# Patient Record
Sex: MALE | Race: OTHER | HISPANIC OR LATINO
[De-identification: names, ages, dates, MRNs, and addresses within clinical notes are randomized per-mention and may not be internally consistent; named-entity substitution may affect disease eponyms.]

---

## 2017-01-04 ENCOUNTER — APPOINTMENT (OUTPATIENT)
Dept: ORTHOPEDIC SURGERY | Facility: CLINIC | Age: 52
End: 2017-01-04

## 2017-01-04 VITALS — BODY MASS INDEX: 27.28 KG/M2 | WEIGHT: 180 LBS | HEIGHT: 68 IN

## 2017-01-04 VITALS — SYSTOLIC BLOOD PRESSURE: 115 MMHG | HEART RATE: 73 BPM | DIASTOLIC BLOOD PRESSURE: 75 MMHG

## 2017-01-04 DIAGNOSIS — Z78.9 OTHER SPECIFIED HEALTH STATUS: ICD-10-CM

## 2017-01-11 ENCOUNTER — APPOINTMENT (OUTPATIENT)
Dept: INTERNAL MEDICINE | Facility: CLINIC | Age: 52
End: 2017-01-11

## 2017-06-28 ENCOUNTER — APPOINTMENT (OUTPATIENT)
Dept: INTERNAL MEDICINE | Facility: CLINIC | Age: 52
End: 2017-06-28

## 2017-06-28 VITALS
OXYGEN SATURATION: 97 % | HEIGHT: 68 IN | WEIGHT: 181 LBS | SYSTOLIC BLOOD PRESSURE: 120 MMHG | TEMPERATURE: 97.8 F | HEART RATE: 66 BPM | RESPIRATION RATE: 14 BRPM | DIASTOLIC BLOOD PRESSURE: 70 MMHG | BODY MASS INDEX: 27.43 KG/M2

## 2017-06-28 DIAGNOSIS — D69.6 THROMBOCYTOPENIA, UNSPECIFIED: ICD-10-CM

## 2017-06-28 DIAGNOSIS — F51.02 ADJUSTMENT INSOMNIA: ICD-10-CM

## 2017-06-28 DIAGNOSIS — E73.9 LACTOSE INTOLERANCE, UNSPECIFIED: ICD-10-CM

## 2017-06-28 DIAGNOSIS — R19.7 DIARRHEA, UNSPECIFIED: ICD-10-CM

## 2017-06-28 DIAGNOSIS — R76.8 OTHER SPECIFIED ABNORMAL IMMUNOLOGICAL FINDINGS IN SERUM: ICD-10-CM

## 2017-06-28 DIAGNOSIS — M54.5 LOW BACK PAIN: ICD-10-CM

## 2017-06-28 DIAGNOSIS — M47.817 SPONDYLOSIS W/OUT MYELOPATHY OR RADICULOPATHY, LUMBOSACRAL REGION: ICD-10-CM

## 2017-06-28 DIAGNOSIS — R06.83 SNORING: ICD-10-CM

## 2017-07-01 ENCOUNTER — LABORATORY RESULT (OUTPATIENT)
Age: 52
End: 2017-07-01

## 2017-07-09 LAB
25(OH)D3 SERPL-MCNC: 28 NG/ML
ALBUMIN SERPL ELPH-MCNC: 4.3 G/DL
ALP BLD-CCNC: 71 U/L
ALT SERPL-CCNC: 56 U/L
ANA PAT FLD IF-IMP: ABNORMAL
ANA SER IF-ACNC: ABNORMAL
ANION GAP SERPL CALC-SCNC: 15 MMOL/L
APPEARANCE: CLEAR
AST SERPL-CCNC: 30 U/L
BACTERIA STL CULT: NORMAL
BASOPHILS # BLD AUTO: 0.01 K/UL
BASOPHILS NFR BLD AUTO: 0.2 %
BILIRUB SERPL-MCNC: 0.9 MG/DL
BILIRUBIN URINE: NEGATIVE
BLOOD URINE: NEGATIVE
BUN SERPL-MCNC: 15 MG/DL
CALCIUM SERPL-MCNC: 9.7 MG/DL
CHLORIDE SERPL-SCNC: 103 MMOL/L
CHOLEST SERPL-MCNC: 197 MG/DL
CHOLEST/HDLC SERPL: 3.5 RATIO
CO2 SERPL-SCNC: 23 MMOL/L
COLOR: YELLOW
CREAT SERPL-MCNC: 1.01 MG/DL
DEPRECATED O AND P PREP STL: NORMAL
EOSINOPHIL # BLD AUTO: 0.05 K/UL
EOSINOPHIL NFR BLD AUTO: 0.9 %
GLUCOSE QUALITATIVE U: NORMAL MG/DL
GLUCOSE SERPL-MCNC: 102 MG/DL
HBA1C MFR BLD HPLC: 5.6 %
HCT VFR BLD CALC: 44.1 %
HDLC SERPL-MCNC: 57 MG/DL
HEMOCCULT STL QL IA: NEGATIVE
HGB BLD-MCNC: 14.8 G/DL
IMM GRANULOCYTES NFR BLD AUTO: 0.5 %
KETONES URINE: ABNORMAL
LDLC SERPL CALC-MCNC: 116 MG/DL
LEUKOCYTE ESTERASE URINE: ABNORMAL
LYMPHOCYTES # BLD AUTO: 1.46 K/UL
LYMPHOCYTES NFR BLD AUTO: 25 %
MAN DIFF?: NORMAL
MCHC RBC-ENTMCNC: 31.6 PG
MCHC RBC-ENTMCNC: 33.6 GM/DL
MCV RBC AUTO: 94 FL
MONOCYTES # BLD AUTO: 0.37 K/UL
MONOCYTES NFR BLD AUTO: 6.3 %
NEUTROPHILS # BLD AUTO: 3.91 K/UL
NEUTROPHILS NFR BLD AUTO: 67.1 %
NITRITE URINE: NEGATIVE
PH URINE: 5.5
PLATELET # BLD AUTO: 135 K/UL
POTASSIUM SERPL-SCNC: 4.4 MMOL/L
PROT SERPL-MCNC: 7.4 G/DL
PROTEIN URINE: NEGATIVE MG/DL
PSA SERPL-MCNC: 1.48 NG/ML
RBC # BLD: 4.69 M/UL
RBC # FLD: 13.5 %
SODIUM SERPL-SCNC: 141 MMOL/L
SPECIFIC GRAVITY URINE: 1.03
TRIGL SERPL-MCNC: 121 MG/DL
TSH SERPL-ACNC: 1.61 UIU/ML
UROBILINOGEN URINE: NORMAL MG/DL
WBC # FLD AUTO: 5.83 K/UL

## 2018-07-11 ENCOUNTER — APPOINTMENT (OUTPATIENT)
Dept: INTERNAL MEDICINE | Facility: CLINIC | Age: 53
End: 2018-07-11

## 2018-07-25 PROBLEM — M47.817 LUMBOSACRAL SPONDYLOSIS: Status: RESOLVED | Noted: 2017-01-04 | Resolved: 2018-07-25

## 2018-07-30 ENCOUNTER — TRANSCRIPTION ENCOUNTER (OUTPATIENT)
Age: 53
End: 2018-07-30

## 2018-08-07 ENCOUNTER — TRANSCRIPTION ENCOUNTER (OUTPATIENT)
Age: 53
End: 2018-08-07

## 2019-04-23 ENCOUNTER — APPOINTMENT (OUTPATIENT)
Dept: INTERNAL MEDICINE | Facility: CLINIC | Age: 54
End: 2019-04-23

## 2019-06-04 ENCOUNTER — APPOINTMENT (OUTPATIENT)
Dept: FAMILY MEDICINE | Facility: CLINIC | Age: 54
End: 2019-06-04
Payer: OTHER GOVERNMENT

## 2019-06-04 VITALS
SYSTOLIC BLOOD PRESSURE: 117 MMHG | HEIGHT: 68 IN | DIASTOLIC BLOOD PRESSURE: 79 MMHG | WEIGHT: 180 LBS | HEART RATE: 74 BPM | OXYGEN SATURATION: 98 % | BODY MASS INDEX: 27.28 KG/M2

## 2019-06-04 DIAGNOSIS — Z00.00 ENCOUNTER FOR GENERAL ADULT MEDICAL EXAMINATION W/OUT ABNORMAL FINDINGS: ICD-10-CM

## 2019-06-04 PROCEDURE — 36415 COLL VENOUS BLD VENIPUNCTURE: CPT

## 2019-06-04 PROCEDURE — 99396 PREV VISIT EST AGE 40-64: CPT | Mod: 25

## 2019-06-04 NOTE — HISTORY OF PRESENT ILLNESS
[FreeTextEntry1] : annual physical [de-identified] : 54 yo M with pre-diabetes, thrombocytopenia, presents for annual physical. He sees Dr. Landeros normally but is on leave at the moment. There was hx positive ALYSON but pt reports no symptoms. He feels well. Only complaint he had today was increased cerumen production since he went to urgent care and had his ears cleaned out. \par \par diet-- very healthy\par exercise-- daily, he is with [employer]

## 2019-06-04 NOTE — PHYSICAL EXAM
[No Acute Distress] : no acute distress [Well Nourished] : well nourished [Normal TMs] : both tympanic membranes were normal [Supple] : supple [Thyroid Normal, No Nodules] : the thyroid was normal and there were no nodules present [No Respiratory Distress] : no respiratory distress  [No Lymphadenopathy] : no lymphadenopathy [Clear to Auscultation] : lungs were clear to auscultation bilaterally [Normal Rate] : normal rate  [Regular Rhythm] : with a regular rhythm [Normal S1, S2] : normal S1 and S2 [Normal Posterior Cervical Nodes] : no posterior cervical lymphadenopathy [No Murmur] : no murmur heard [Normal Anterior Cervical Nodes] : no anterior cervical lymphadenopathy [Normal Gait] : normal gait [Normal Affect] : the affect was normal [Normal Insight/Judgement] : insight and judgment were intact [de-identified] : minor cerumen noted b/l

## 2019-06-04 NOTE — HEALTH RISK ASSESSMENT
[] : No [No falls in past year] : Patient reported no falls in the past year [0] : 2) Feeling down, depressed, or hopeless: Not at all (0) [de-identified] : daily exercise [de-identified] : healthy [[id number]] : 0 [HIV test declined] : HIV test declined [Hepatitis C test declined] : Hepatitis C test declined [] :  [Sexually Active] : sexually active [Employed] : employed [High Risk Behavior] : no high risk behavior [Fully functional (bathing, dressing, toileting, transferring, walking, feeding)] : Fully functional (bathing, dressing, toileting, transferring, walking, feeding) [Fully functional (using the telephone, shopping, preparing meals, housekeeping, doing laundry, using] : Fully functional and needs no help or supervision to perform IADLs (using the telephone, shopping, preparing meals, housekeeping, doing laundry, using transportation, managing medications and managing finances)

## 2019-06-05 LAB
25(OH)D3 SERPL-MCNC: 28.6 NG/ML
ALBUMIN SERPL ELPH-MCNC: 4.8 G/DL
ALP BLD-CCNC: 76 U/L
ALT SERPL-CCNC: 26 U/L
ANION GAP SERPL CALC-SCNC: 12 MMOL/L
AST SERPL-CCNC: 22 U/L
BASOPHILS # BLD AUTO: 0.02 K/UL
BASOPHILS NFR BLD AUTO: 0.4 %
BILIRUB SERPL-MCNC: 0.4 MG/DL
BUN SERPL-MCNC: 16 MG/DL
CALCIUM SERPL-MCNC: 9.6 MG/DL
CHLORIDE SERPL-SCNC: 103 MMOL/L
CHOLEST SERPL-MCNC: 204 MG/DL
CHOLEST/HDLC SERPL: 3.6 RATIO
CO2 SERPL-SCNC: 27 MMOL/L
CREAT SERPL-MCNC: 0.93 MG/DL
EOSINOPHIL # BLD AUTO: 0.11 K/UL
EOSINOPHIL NFR BLD AUTO: 1.9 %
ESTIMATED AVERAGE GLUCOSE: 114 MG/DL
GLUCOSE SERPL-MCNC: 97 MG/DL
HBA1C MFR BLD HPLC: 5.6 %
HCT VFR BLD CALC: 47.4 %
HDLC SERPL-MCNC: 56 MG/DL
HGB BLD-MCNC: 15.2 G/DL
IMM GRANULOCYTES NFR BLD AUTO: 0.5 %
LDLC SERPL CALC-MCNC: 109 MG/DL
LYMPHOCYTES # BLD AUTO: 1.69 K/UL
LYMPHOCYTES NFR BLD AUTO: 29.7 %
MAN DIFF?: NORMAL
MCHC RBC-ENTMCNC: 31 PG
MCHC RBC-ENTMCNC: 32.1 GM/DL
MCV RBC AUTO: 96.5 FL
MONOCYTES # BLD AUTO: 0.41 K/UL
MONOCYTES NFR BLD AUTO: 7.2 %
NEUTROPHILS # BLD AUTO: 3.43 K/UL
NEUTROPHILS NFR BLD AUTO: 60.3 %
PLATELET # BLD AUTO: 128 K/UL
POTASSIUM SERPL-SCNC: 4.1 MMOL/L
PROT SERPL-MCNC: 7.4 G/DL
PSA SERPL-MCNC: 1.63 NG/ML
RBC # BLD: 4.91 M/UL
RBC # FLD: 12.5 %
SODIUM SERPL-SCNC: 142 MMOL/L
TRIGL SERPL-MCNC: 193 MG/DL
TSH SERPL-ACNC: 1.99 UIU/ML
WBC # FLD AUTO: 5.69 K/UL

## 2020-06-22 ENCOUNTER — TRANSCRIPTION ENCOUNTER (OUTPATIENT)
Age: 55
End: 2020-06-22

## 2020-08-15 ENCOUNTER — TRANSCRIPTION ENCOUNTER (OUTPATIENT)
Age: 55
End: 2020-08-15

## 2020-08-20 ENCOUNTER — NON-APPOINTMENT (OUTPATIENT)
Age: 55
End: 2020-08-20

## 2020-08-20 ENCOUNTER — APPOINTMENT (OUTPATIENT)
Dept: INTERNAL MEDICINE | Facility: CLINIC | Age: 55
End: 2020-08-20
Payer: OTHER GOVERNMENT

## 2020-08-20 VITALS
OXYGEN SATURATION: 98 % | DIASTOLIC BLOOD PRESSURE: 82 MMHG | HEART RATE: 73 BPM | SYSTOLIC BLOOD PRESSURE: 116 MMHG | WEIGHT: 184.25 LBS | BODY MASS INDEX: 27.92 KG/M2 | HEIGHT: 68 IN | TEMPERATURE: 97.6 F

## 2020-08-20 DIAGNOSIS — M79.642 PAIN IN LEFT HAND: ICD-10-CM

## 2020-08-20 DIAGNOSIS — E03.9 HYPOTHYROIDISM, UNSPECIFIED: ICD-10-CM

## 2020-08-20 PROCEDURE — 99386 PREV VISIT NEW AGE 40-64: CPT | Mod: 25

## 2020-08-20 PROCEDURE — 93000 ELECTROCARDIOGRAM COMPLETE: CPT

## 2020-08-20 PROCEDURE — 36415 COLL VENOUS BLD VENIPUNCTURE: CPT

## 2020-08-20 NOTE — HEALTH RISK ASSESSMENT
[Patient declined mammogram] : Patient declined mammogram [Patient declined bone density test] : Patient declined bone density test [Patient declined PAP Smear] : Patient declined PAP Smear [Patient declined colonoscopy] : Patient declined colonoscopy [Good] : ~his/her~  mood as  good [Yes] : Yes [] : No [Monthly or less (1 pt)] : Monthly or less (1 point) [1 or 2 (0 pts)] : 1 or 2 (0 points) [Never (0 pts)] : Never (0 points) [No] : In the past 12 months have you used drugs other than those required for medical reasons? No [No falls in past year] : Patient reported no falls in the past year [[id number]] : 0 [0] : 2) Feeling down, depressed, or hopeless: Not at all (0) [Patient declined Low Dose CT Scan] : Patient declined Low Dose CT Scan [Patient declined Retinal Exam] : Patient declined Retinal Exam. [Hepatitis C test declined] : Hepatitis C test declined [HIV test declined] : HIV test declined

## 2020-08-20 NOTE — PHYSICAL EXAM
[Well Nourished] : well nourished [No Acute Distress] : no acute distress [Well-Appearing] : well-appearing [Well Developed] : well developed [PERRL] : pupils equal round and reactive to light [Normal Sclera/Conjunctiva] : normal sclera/conjunctiva [EOMI] : extraocular movements intact [Normal Oropharynx] : the oropharynx was normal [Normal Outer Ear/Nose] : the outer ears and nose were normal in appearance [No JVD] : no jugular venous distention [Supple] : supple [No Lymphadenopathy] : no lymphadenopathy [Thyroid Normal, No Nodules] : the thyroid was normal and there were no nodules present [No Respiratory Distress] : no respiratory distress  [Clear to Auscultation] : lungs were clear to auscultation bilaterally [Normal Rate] : normal rate  [No Accessory Muscle Use] : no accessory muscle use [Regular Rhythm] : with a regular rhythm [Normal S1, S2] : normal S1 and S2 [No Carotid Bruits] : no carotid bruits [No Murmur] : no murmur heard [No Varicosities] : no varicosities [No Abdominal Bruit] : a ~M bruit was not heard ~T in the abdomen [No Edema] : there was no peripheral edema [Pedal Pulses Present] : the pedal pulses are present [No Extremity Clubbing/Cyanosis] : no extremity clubbing/cyanosis [No Palpable Aorta] : no palpable aorta [Soft] : abdomen soft [Non Tender] : non-tender [Non-distended] : non-distended [No Masses] : no abdominal mass palpated [No HSM] : no HSM [Normal Posterior Cervical Nodes] : no posterior cervical lymphadenopathy [Normal Bowel Sounds] : normal bowel sounds [No CVA Tenderness] : no CVA  tenderness [Normal Anterior Cervical Nodes] : no anterior cervical lymphadenopathy [No Joint Swelling] : no joint swelling [No Spinal Tenderness] : no spinal tenderness [Grossly Normal Strength/Tone] : grossly normal strength/tone [Coordination Grossly Intact] : coordination grossly intact [No Rash] : no rash [Normal Gait] : normal gait [No Focal Deficits] : no focal deficits [Normal Affect] : the affect was normal [Normal Insight/Judgement] : insight and judgment were intact

## 2020-08-20 NOTE — HISTORY OF PRESENT ILLNESS
[FreeTextEntry1] : np est [de-identified] : Mr. SANJEEV ESPARZA is a 54 year male comes to the office for physical exam. Patient feels well and has no complaints at this time.

## 2020-08-21 LAB
25(OH)D3 SERPL-MCNC: 40.4 NG/ML
BASOPHILS # BLD AUTO: 0.03 K/UL
BASOPHILS NFR BLD AUTO: 0.5 %
CHOLEST SERPL-MCNC: 217 MG/DL
CHOLEST/HDLC SERPL: 4.1 RATIO
EOSINOPHIL # BLD AUTO: 0.17 K/UL
EOSINOPHIL NFR BLD AUTO: 3 %
ESTIMATED AVERAGE GLUCOSE: 114 MG/DL
HBA1C MFR BLD HPLC: 5.6 %
HCT VFR BLD CALC: 48.4 %
HDLC SERPL-MCNC: 53 MG/DL
HGB BLD-MCNC: 15.7 G/DL
IMM GRANULOCYTES NFR BLD AUTO: 0.5 %
LDLC SERPL CALC-MCNC: 103 MG/DL
LYMPHOCYTES # BLD AUTO: 1.62 K/UL
LYMPHOCYTES NFR BLD AUTO: 28.3 %
MAN DIFF?: NORMAL
MCHC RBC-ENTMCNC: 31.3 PG
MCHC RBC-ENTMCNC: 32.4 GM/DL
MCV RBC AUTO: 96.4 FL
MONOCYTES # BLD AUTO: 0.41 K/UL
MONOCYTES NFR BLD AUTO: 7.2 %
NEUTROPHILS # BLD AUTO: 3.46 K/UL
NEUTROPHILS NFR BLD AUTO: 60.5 %
PLATELET # BLD AUTO: 129 K/UL
PSA SERPL-MCNC: 1.42 NG/ML
RBC # BLD: 5.02 M/UL
RBC # FLD: 12.3 %
TRIGL SERPL-MCNC: 303 MG/DL
TSH SERPL-ACNC: 1.63 UIU/ML
WBC # FLD AUTO: 5.72 K/UL

## 2020-09-25 ENCOUNTER — TRANSCRIPTION ENCOUNTER (OUTPATIENT)
Age: 55
End: 2020-09-25

## 2020-09-28 ENCOUNTER — APPOINTMENT (OUTPATIENT)
Dept: ORTHOPEDIC SURGERY | Facility: CLINIC | Age: 55
End: 2020-09-28
Payer: OTHER GOVERNMENT

## 2020-09-28 VITALS — TEMPERATURE: 97.4 F

## 2020-09-28 VITALS
HEART RATE: 83 BPM | HEIGHT: 68 IN | WEIGHT: 184 LBS | SYSTOLIC BLOOD PRESSURE: 129 MMHG | BODY MASS INDEX: 27.89 KG/M2 | DIASTOLIC BLOOD PRESSURE: 91 MMHG

## 2020-09-28 DIAGNOSIS — M79.645 PAIN IN LEFT FINGER(S): ICD-10-CM

## 2020-09-28 PROCEDURE — 99204 OFFICE O/P NEW MOD 45 MIN: CPT

## 2020-09-28 PROCEDURE — 73130 X-RAY EXAM OF HAND: CPT | Mod: LT

## 2020-10-30 ENCOUNTER — APPOINTMENT (OUTPATIENT)
Dept: MRI IMAGING | Facility: CLINIC | Age: 55
End: 2020-10-30
Payer: OTHER GOVERNMENT

## 2020-10-30 ENCOUNTER — OUTPATIENT (OUTPATIENT)
Dept: OUTPATIENT SERVICES | Facility: HOSPITAL | Age: 55
LOS: 1 days | End: 2020-10-30

## 2020-10-30 DIAGNOSIS — M79.645 PAIN IN LEFT FINGER(S): ICD-10-CM

## 2020-10-30 PROCEDURE — 73218 MRI UPPER EXTREMITY W/O DYE: CPT | Mod: 26,LT

## 2020-11-18 ENCOUNTER — APPOINTMENT (OUTPATIENT)
Dept: GASTROENTEROLOGY | Facility: CLINIC | Age: 55
End: 2020-11-18
Payer: OTHER GOVERNMENT

## 2020-11-18 VITALS
SYSTOLIC BLOOD PRESSURE: 128 MMHG | RESPIRATION RATE: 15 BRPM | TEMPERATURE: 97.7 F | WEIGHT: 184 LBS | HEART RATE: 73 BPM | HEIGHT: 68 IN | DIASTOLIC BLOOD PRESSURE: 90 MMHG | BODY MASS INDEX: 27.89 KG/M2

## 2020-11-18 VITALS
DIASTOLIC BLOOD PRESSURE: 90 MMHG | WEIGHT: 184 LBS | HEIGHT: 68 IN | HEART RATE: 73 BPM | RESPIRATION RATE: 15 BRPM | TEMPERATURE: 97.7 F | SYSTOLIC BLOOD PRESSURE: 128 MMHG | BODY MASS INDEX: 27.89 KG/M2

## 2020-11-18 PROCEDURE — 99204 OFFICE O/P NEW MOD 45 MIN: CPT

## 2020-11-18 NOTE — HISTORY OF PRESENT ILLNESS
[None] : had no significant interval events [Heartburn] : heartburn worsened [Nausea] : denies nausea [Vomiting] : denies vomiting [Diarrhea] : denies diarrhea [Constipation] : denies constipation [Yellow Skin Or Eyes (Jaundice)] : denies jaundice [Abdominal Pain] : denies abdominal pain [Abdominal Swelling] : denies abdominal swelling [Rectal Pain] : denies rectal pain [Wt Gain ___ Lbs] : no recent weight gain [Wt Loss ___ Lbs] : no recent weight loss [GERD] : no gastroesophageal reflux disease [Hiatus Hernia] : no hiatus hernia [Peptic Ulcer Disease] : no peptic ulcer disease [Pancreatitis] : no pancreatitis [Cholelithiasis] : no cholelithiasis [Kidney Stone] : no kidney stone [Inflammatory Bowel Disease] : no inflammatory bowel disease [Irritable Bowel Syndrome] : no irritable bowel syndrome [Diverticulitis] : no diverticulitis [Alcohol Abuse] : no alcohol abuse [Malignancy] : no malignancy [Abdominal Surgery] : no abdominal surgery [Appendectomy] : no appendectomy [Cholecystectomy] : no cholecystectomy [de-identified] : this is the patient's first visit here [de-identified] : Patient presents for initial evaluation of chronic GERD with no previous upper endoscopies and for colon cancer screening with no previous colonoscopies. He is upper esophageal reflux symptoms and had an indirect laryngoscopy done in the past that showed chronic acid reflux. He states that he has been constantly clear his throat after eating and is concerned. He has no lower GI symptoms or complaints or first-degree relatives with colon cancer or colon polyps but does have a paternal uncle who had colon cancer.

## 2020-11-18 NOTE — ASSESSMENT
[FreeTextEntry1] : Impression: Colon cancer screening rule out colonic neoplasm. Patient has had no previous colonoscopies. Chronic GERD rule out reflux or Adames's esophagitis. Patient has had no previous upper endoscopies.\par \par Recommendations: Low-risk screening colonoscopy and upper endoscopy were advised for further evaluation of the above. The risks versus benefits of colonoscopy and upper endoscopy and intravenous sedation, and alternative testing such as virtual colonoscopy and upper GI series, were individually explained to the patient today who appeared to understand all of the above and was agreeable to proceeding with both procedures. His ASA classification is 1. He will be prepped with MiraLax and Dulcolax tablets for colonoscopy and is medically optimized for both upper endoscopy and colonoscopy and appeared to understand all of the above instructions, information, and management plan.

## 2020-11-18 NOTE — REASON FOR VISIT
[Initial Evaluation] : an initial evaluation [FreeTextEntry1] : chronic GERD and colon cancer screening

## 2020-12-19 ENCOUNTER — APPOINTMENT (OUTPATIENT)
Dept: DISASTER EMERGENCY | Facility: CLINIC | Age: 55
End: 2020-12-19

## 2020-12-22 ENCOUNTER — APPOINTMENT (OUTPATIENT)
Dept: GASTROENTEROLOGY | Facility: GI CENTER | Age: 55
End: 2020-12-22
Payer: OTHER GOVERNMENT

## 2020-12-22 ENCOUNTER — OUTPATIENT (OUTPATIENT)
Dept: OUTPATIENT SERVICES | Facility: HOSPITAL | Age: 55
LOS: 1 days | End: 2020-12-22
Payer: OTHER GOVERNMENT

## 2020-12-22 ENCOUNTER — RESULT REVIEW (OUTPATIENT)
Age: 55
End: 2020-12-22

## 2020-12-22 DIAGNOSIS — K64.8 OTHER HEMORRHOIDS: ICD-10-CM

## 2020-12-22 DIAGNOSIS — Z12.11 ENCOUNTER FOR SCREENING FOR MALIGNANT NEOPLASM OF COLON: ICD-10-CM

## 2020-12-22 DIAGNOSIS — K21.9 GASTRO-ESOPHAGEAL REFLUX DISEASE W/OUT ESOPHAGITIS: ICD-10-CM

## 2020-12-22 DIAGNOSIS — K21.9 GASTRO-ESOPHAGEAL REFLUX DISEASE WITHOUT ESOPHAGITIS: ICD-10-CM

## 2020-12-22 DIAGNOSIS — K29.00 ACUTE GASTRITIS W/OUT BLEEDING: ICD-10-CM

## 2020-12-22 DIAGNOSIS — A04.8 OTHER SPECIFIED BACTERIAL INTESTINAL INFECTIONS: ICD-10-CM

## 2020-12-22 DIAGNOSIS — D12.3 BENIGN NEOPLASM OF TRANSVERSE COLON: ICD-10-CM

## 2020-12-22 LAB — SARS-COV-2 N GENE NPH QL NAA+PROBE: NOT DETECTED

## 2020-12-22 PROCEDURE — 45385 COLONOSCOPY W/LESION REMOVAL: CPT | Mod: PT

## 2020-12-22 PROCEDURE — 43239 EGD BIOPSY SINGLE/MULTIPLE: CPT | Mod: 59

## 2020-12-22 PROCEDURE — 88342 IMHCHEM/IMCYTCHM 1ST ANTB: CPT | Mod: 26

## 2020-12-22 PROCEDURE — 88305 TISSUE EXAM BY PATHOLOGIST: CPT | Mod: 26

## 2020-12-22 PROCEDURE — 43239 EGD BIOPSY SINGLE/MULTIPLE: CPT

## 2020-12-22 PROCEDURE — 45385 COLONOSCOPY W/LESION REMOVAL: CPT

## 2020-12-22 PROCEDURE — 88342 IMHCHEM/IMCYTCHM 1ST ANTB: CPT

## 2020-12-22 PROCEDURE — 88305 TISSUE EXAM BY PATHOLOGIST: CPT

## 2020-12-22 NOTE — REASON FOR VISIT
[Procedure: _________] : a [unfilled] procedure visit [Other: ____] : a [unfilled] [FreeTextEntry2] : Patient is here for EGD for evaluation of chronic GERD and colonoscopy for colon cancer screening.

## 2020-12-22 NOTE — PROCEDURE
[With Snare Polypectomy] : snare polypectomy [With Cautery] : cautery [Colon Cancer Screening] : colon cancer screening [Bleeding] : bleeding risk [Infection] : risk of infection [Bowel Prep Kit] : the patient took the appropriate bowel preparation kit as directed [Approved Diet Followed] : the patient avoided solid foods and adhered to the approved diet list for 24 hours prior to the procedure [Prep Qualtiy: ___] : Prep Quality:  [unfilled] [Withdrawal Time: ___] : Withdrawal Time:  [unfilled] [Left Lateral Decubitus] : The patient was positioned in the left lateral decubitus position [Slightly Enlarged Prostate] : a slightly enlarged prostate [Cecum (Landmarks/Transillum)] : and guided to the cecum which was identified by the anatomic landmarks of the appendiceal orifice and ileocecal valve and by transillumination in the right lower quadrant [No Difficulty] : without difficulty [Insufflated] : insufflated [Single Pass Needed] : after a single pass [Retroflex View] : a retroflex view of the rectum was performed [Polyps] : polyps [Hot Snare Polypectomy] : hot snare polypectomy [Hemorrhoids] : hemorrhoids [No Complications] : There were no complications [With Biopsy] : with biopsy [GERD] : GERD [Procedure Explained] : The procedure was explained [Allergies Reviewed] : allergies reviewed. [Risks] : Risks [Benefits] : benefits [Alternatives] : alternatives [Consent Obtained] : written consent was obtained prior to the procedure and is detailed in the patient's record [Patient] : the patient [Automated Blood Pressure Cuff] : automated blood pressure cuff [Cardiac Monitor] : cardiac monitor [Pulse Oximeter] : pulse oximeter [Propofol ___ mg IV] : Propofol [unfilled] ~Umg intravenously [___ L/min Oxygen via NC] : [unfilled] ~Uliters/minute oxygen via nasal cannula [2] : 2 [Sedation Clearance] : the patient was cleared for moderate sedation [Time started: ___] : Start Time:  [unfilled] [Time Completed: ___] : Completion Time:  [unfilled] [Performed By: ___] : Performed by:  FRITZ [Erythema] : erythema [Normal] : Normal [Sent to Pathology] : was sent to pathology for analysis [Tolerated Well] : the patient tolerated the procedure well [Vital Signs Stable] : the vital signs were stable [Abnormal Rectum] : a normal rectum [External Hemorrhoids] : no external hemorrhoids [Patient Rotated Into Alternating Positions] : the patient was not rotated [de-identified] : [id number] [de-identified] : A sessile roughly 1 cm. polyp was completely removed via hot snare polypectomy with good cautery @ polypectomy site. [de-identified] : Internal hemorrhoids noted on retroflexio. [de-identified] : [id number] [de-identified] : Biopsies taken for HP. [de-identified] : Moderate antral gastritis seen and biopsied for HP [de-identified] : Antral gastritis r/o HP. No esophagitis seen.

## 2020-12-22 NOTE — PROCEDURE
[With Snare Polypectomy] : snare polypectomy [With Cautery] : cautery [Colon Cancer Screening] : colon cancer screening [Bleeding] : bleeding risk [Infection] : risk of infection [Bowel Prep Kit] : the patient took the appropriate bowel preparation kit as directed [Approved Diet Followed] : the patient avoided solid foods and adhered to the approved diet list for 24 hours prior to the procedure [Prep Qualtiy: ___] : Prep Quality:  [unfilled] [Withdrawal Time: ___] : Withdrawal Time:  [unfilled] [Left Lateral Decubitus] : The patient was positioned in the left lateral decubitus position [Slightly Enlarged Prostate] : a slightly enlarged prostate [Cecum (Landmarks/Transillum)] : and guided to the cecum which was identified by the anatomic landmarks of the appendiceal orifice and ileocecal valve and by transillumination in the right lower quadrant [No Difficulty] : without difficulty [Insufflated] : insufflated [Single Pass Needed] : after a single pass [Retroflex View] : a retroflex view of the rectum was performed [Polyps] : polyps [Hot Snare Polypectomy] : hot snare polypectomy [Hemorrhoids] : hemorrhoids [No Complications] : There were no complications [With Biopsy] : with biopsy [GERD] : GERD [Procedure Explained] : The procedure was explained [Allergies Reviewed] : allergies reviewed. [Risks] : Risks [Benefits] : benefits [Alternatives] : alternatives [Consent Obtained] : written consent was obtained prior to the procedure and is detailed in the patient's record [Patient] : the patient [Automated Blood Pressure Cuff] : automated blood pressure cuff [Cardiac Monitor] : cardiac monitor [Pulse Oximeter] : pulse oximeter [Propofol ___ mg IV] : Propofol [unfilled] ~Umg intravenously [___ L/min Oxygen via NC] : [unfilled] ~Uliters/minute oxygen via nasal cannula [2] : 2 [Sedation Clearance] : the patient was cleared for moderate sedation [Time started: ___] : Start Time:  [unfilled] [Time Completed: ___] : Completion Time:  [unfilled] [Performed By: ___] : Performed by:  FRITZ [Erythema] : erythema [Normal] : Normal [Sent to Pathology] : was sent to pathology for analysis [Tolerated Well] : the patient tolerated the procedure well [Vital Signs Stable] : the vital signs were stable [Abnormal Rectum] : a normal rectum [External Hemorrhoids] : no external hemorrhoids [Patient Rotated Into Alternating Positions] : the patient was not rotated [de-identified] : [id number] [de-identified] : A sessile roughly 1 cm. polyp was completely removed via hot snare polypectomy with good cautery @ polypectomy site. [de-identified] : Internal hemorrhoids noted on retroflexio. [de-identified] : [id number] [de-identified] : Biopsies taken for HP. [de-identified] : Moderate antral gastritis seen and biopsied for HP [de-identified] : Antral gastritis r/o HP. No esophagitis seen.

## 2020-12-22 NOTE — ASSESSMENT
[FreeTextEntry1] : Omeprazole 40 mg./d. for Rx of gastritis. RTO in 8 weeks. Await polyp pathology results.

## 2020-12-24 LAB — SURGICAL PATHOLOGY STUDY: SIGNIFICANT CHANGE UP

## 2020-12-27 PROBLEM — A04.8 HELICOBACTER PYLORI (H. PYLORI) INFECTION: Status: ACTIVE | Noted: 2020-12-27

## 2021-11-12 ENCOUNTER — APPOINTMENT (OUTPATIENT)
Dept: INTERNAL MEDICINE | Facility: CLINIC | Age: 56
End: 2021-11-12
Payer: OTHER GOVERNMENT

## 2021-11-12 VITALS
DIASTOLIC BLOOD PRESSURE: 79 MMHG | WEIGHT: 187 LBS | BODY MASS INDEX: 28.34 KG/M2 | SYSTOLIC BLOOD PRESSURE: 114 MMHG | HEIGHT: 68 IN | TEMPERATURE: 97.1 F | HEART RATE: 93 BPM

## 2021-11-12 DIAGNOSIS — Z13.29 ENCOUNTER FOR SCREENING FOR OTHER SUSPECTED ENDOCRINE DISORDER: ICD-10-CM

## 2021-11-12 DIAGNOSIS — Z13.220 ENCOUNTER FOR SCREENING FOR LIPOID DISORDERS: ICD-10-CM

## 2021-11-12 DIAGNOSIS — Z13.1 ENCOUNTER FOR SCREENING FOR DIABETES MELLITUS: ICD-10-CM

## 2021-11-12 DIAGNOSIS — Z12.5 ENCOUNTER FOR SCREENING FOR MALIGNANT NEOPLASM OF PROSTATE: ICD-10-CM

## 2021-11-12 DIAGNOSIS — E55.9 VITAMIN D DEFICIENCY, UNSPECIFIED: ICD-10-CM

## 2021-11-12 PROCEDURE — 36415 COLL VENOUS BLD VENIPUNCTURE: CPT

## 2021-11-12 PROCEDURE — 99396 PREV VISIT EST AGE 40-64: CPT | Mod: 25

## 2021-11-12 RX ORDER — METRONIDAZOLE 500 MG/1
500 TABLET ORAL
Qty: 28 | Refills: 0 | Status: COMPLETED | COMMUNITY
Start: 2020-12-27 | End: 2021-11-12

## 2021-11-12 RX ORDER — OMEPRAZOLE 40 MG/1
40 CAPSULE, DELAYED RELEASE ORAL
Qty: 30 | Refills: 5 | Status: COMPLETED | COMMUNITY
Start: 2020-12-22 | End: 2021-11-12

## 2021-11-12 RX ORDER — AMOXICILLIN 500 MG/1
500 CAPSULE ORAL TWICE DAILY
Qty: 56 | Refills: 0 | Status: COMPLETED | COMMUNITY
Start: 2020-12-27 | End: 2021-11-12

## 2021-11-12 RX ORDER — CLARITHROMYCIN 500 MG/1
500 TABLET, FILM COATED ORAL
Qty: 28 | Refills: 0 | Status: COMPLETED | COMMUNITY
Start: 2020-12-27 | End: 2021-11-12

## 2021-11-12 RX ORDER — OMEPRAZOLE 20 MG/1
20 CAPSULE, DELAYED RELEASE ORAL
Qty: 28 | Refills: 0 | Status: COMPLETED | COMMUNITY
Start: 2020-12-27 | End: 2021-11-12

## 2021-11-12 NOTE — HISTORY OF PRESENT ILLNESS
[FreeTextEntry1] : np est [de-identified] : Mr. SANJEEV ESPARZA is a 56 year male comes to the office for physical exam. Patient denies fever, cough SOB. No other complaints at this time.

## 2021-11-12 NOTE — HEALTH RISK ASSESSMENT
[Good] : ~his/her~  mood as  good [Yes] : Yes [Monthly or less (1 pt)] : Monthly or less (1 point) [1 or 2 (0 pts)] : 1 or 2 (0 points) [Never (0 pts)] : Never (0 points) [No] : In the past 12 months have you used drugs other than those required for medical reasons? No [No falls in past year] : Patient reported no falls in the past year [0] : 2) Feeling down, depressed, or hopeless: Not at all (0) [Patient declined Low Dose CT Scan] : Patient declined Low Dose CT Scan [Patient declined Retinal Exam] : Patient declined Retinal Exam. [Patient declined mammogram] : Patient declined mammogram [Patient declined PAP Smear] : Patient declined PAP Smear [Patient declined bone density test] : Patient declined bone density test [Patient declined colonoscopy] : Patient declined colonoscopy [HIV test declined] : HIV test declined [Hepatitis C test declined] : Hepatitis C test declined [] : No [PHQ-2 Negative - No further assessment needed] : PHQ-2 Negative - No further assessment needed [[id number]] : 0

## 2021-11-15 LAB
25(OH)D3 SERPL-MCNC: 41.7 NG/ML
ALBUMIN SERPL ELPH-MCNC: 4.5 G/DL
ALP BLD-CCNC: 82 U/L
ALT SERPL-CCNC: 42 U/L
ANION GAP SERPL CALC-SCNC: 16 MMOL/L
AST SERPL-CCNC: 26 U/L
BASOPHILS # BLD AUTO: 0.03 K/UL
BASOPHILS NFR BLD AUTO: 0.4 %
BILIRUB SERPL-MCNC: 0.7 MG/DL
BUN SERPL-MCNC: 17 MG/DL
CALCIUM SERPL-MCNC: 9.7 MG/DL
CHLORIDE SERPL-SCNC: 103 MMOL/L
CHOLEST SERPL-MCNC: 196 MG/DL
CO2 SERPL-SCNC: 22 MMOL/L
CREAT SERPL-MCNC: 1.07 MG/DL
EOSINOPHIL # BLD AUTO: 0.38 K/UL
EOSINOPHIL NFR BLD AUTO: 5.5 %
ESTIMATED AVERAGE GLUCOSE: 114 MG/DL
GLUCOSE SERPL-MCNC: 73 MG/DL
HBA1C MFR BLD HPLC: 5.6 %
HCT VFR BLD CALC: 46.9 %
HDLC SERPL-MCNC: 48 MG/DL
HGB BLD-MCNC: 14.7 G/DL
IMM GRANULOCYTES NFR BLD AUTO: 0.4 %
LDLC SERPL CALC-MCNC: 107 MG/DL
LYMPHOCYTES # BLD AUTO: 1.77 K/UL
LYMPHOCYTES NFR BLD AUTO: 25.7 %
MAN DIFF?: NORMAL
MCHC RBC-ENTMCNC: 30.9 PG
MCHC RBC-ENTMCNC: 31.3 GM/DL
MCV RBC AUTO: 98.5 FL
MONOCYTES # BLD AUTO: 0.41 K/UL
MONOCYTES NFR BLD AUTO: 6 %
NEUTROPHILS # BLD AUTO: 4.26 K/UL
NEUTROPHILS NFR BLD AUTO: 62 %
NONHDLC SERPL-MCNC: 148 MG/DL
PLATELET # BLD AUTO: 128 K/UL
POTASSIUM SERPL-SCNC: 4.7 MMOL/L
PROT SERPL-MCNC: 7.2 G/DL
PSA SERPL-MCNC: 1.42 NG/ML
RBC # BLD: 4.76 M/UL
RBC # FLD: 13.1 %
SODIUM SERPL-SCNC: 142 MMOL/L
TRIGL SERPL-MCNC: 207 MG/DL
TSH SERPL-ACNC: 1.16 UIU/ML
WBC # FLD AUTO: 6.88 K/UL

## 2022-07-16 ENCOUNTER — NON-APPOINTMENT (OUTPATIENT)
Age: 57
End: 2022-07-16

## 2023-01-24 ENCOUNTER — APPOINTMENT (OUTPATIENT)
Dept: INTERNAL MEDICINE | Facility: CLINIC | Age: 58
End: 2023-01-24
Payer: OTHER GOVERNMENT

## 2023-01-24 VITALS
BODY MASS INDEX: 28.34 KG/M2 | DIASTOLIC BLOOD PRESSURE: 86 MMHG | HEART RATE: 72 BPM | SYSTOLIC BLOOD PRESSURE: 128 MMHG | HEIGHT: 68 IN | WEIGHT: 187 LBS

## 2023-01-24 DIAGNOSIS — Z00.00 ENCOUNTER FOR GENERAL ADULT MEDICAL EXAMINATION W/OUT ABNORMAL FINDINGS: ICD-10-CM

## 2023-01-24 DIAGNOSIS — N52.9 MALE ERECTILE DYSFUNCTION, UNSPECIFIED: ICD-10-CM

## 2023-01-24 DIAGNOSIS — H91.91 UNSPECIFIED HEARING LOSS, RIGHT EAR: ICD-10-CM

## 2023-01-24 PROCEDURE — 99396 PREV VISIT EST AGE 40-64: CPT | Mod: 25

## 2023-01-24 PROCEDURE — 36415 COLL VENOUS BLD VENIPUNCTURE: CPT

## 2023-01-24 NOTE — HEALTH RISK ASSESSMENT
[Good] : ~his/her~  mood as  good [Yes] : Yes [Monthly or less (1 pt)] : Monthly or less (1 point) [1 or 2 (0 pts)] : 1 or 2 (0 points) [Never (0 pts)] : Never (0 points) [No] : In the past 12 months have you used drugs other than those required for medical reasons? No [No falls in past year] : Patient reported no falls in the past year [0] : 2) Feeling down, depressed, or hopeless: Not at all (0) [PHQ-2 Negative - No further assessment needed] : PHQ-2 Negative - No further assessment needed [Patient declined Low Dose CT Scan] : Patient declined Low Dose CT Scan [Patient declined Retinal Exam] : Patient declined Retinal Exam. [Patient declined mammogram] : Patient declined mammogram [Patient declined PAP Smear] : Patient declined PAP Smear [Patient declined bone density test] : Patient declined bone density test [Patient reported colonoscopy was normal] : Patient reported colonoscopy was normal [HIV test declined] : HIV test declined [Hepatitis C test declined] : Hepatitis C test declined [[id number]] : 0 [ColonoscopyDate] : 12/20

## 2023-01-24 NOTE — HISTORY OF PRESENT ILLNESS
[FreeTextEntry1] : physical exam.  [de-identified] : Mr. SANJEEV ESPARZA is a 57 year male PMH of MARTI on CPAP comes to the office for physical exam. Patient denies fever, cough SOB. No other complaints at this time.

## 2023-01-24 NOTE — PLAN
[FreeTextEntry1] : In regards to patients Physical exam, routine blood work drawn, will review results with patient.\par \par MARTI- patient will continue with CPAP \par \par ED- patient prescribed Viagra- patient advised to go to the hospital if erection last more that 4 hours.\par \par  Decreased hearing right ear- patient referred to ENT \par \par Counseling included abnormal lab results, differential diagnoses, treatment options, risks and benefits, lifestyle changes, current condition, medications, and dose adjustments. \par The patient was interactive, attentive, asked questions, and verbalized understanding

## 2023-01-26 LAB
25(OH)D3 SERPL-MCNC: 35.1 NG/ML
ALBUMIN SERPL ELPH-MCNC: 4.8 G/DL
ALP BLD-CCNC: 93 U/L
ALT SERPL-CCNC: 49 U/L
ANION GAP SERPL CALC-SCNC: 13 MMOL/L
AST SERPL-CCNC: 29 U/L
BASOPHILS # BLD AUTO: 0.03 K/UL
BASOPHILS NFR BLD AUTO: 0.5 %
BILIRUB SERPL-MCNC: 0.4 MG/DL
BUN SERPL-MCNC: 14 MG/DL
CALCIUM SERPL-MCNC: 9.8 MG/DL
CHLORIDE SERPL-SCNC: 103 MMOL/L
CHOLEST SERPL-MCNC: 196 MG/DL
CO2 SERPL-SCNC: 26 MMOL/L
CREAT SERPL-MCNC: 0.98 MG/DL
EGFR: 90 ML/MIN/1.73M2
EOSINOPHIL # BLD AUTO: 0.13 K/UL
EOSINOPHIL NFR BLD AUTO: 2.2 %
ESTIMATED AVERAGE GLUCOSE: 114 MG/DL
GLUCOSE SERPL-MCNC: 108 MG/DL
HBA1C MFR BLD HPLC: 5.6 %
HCT VFR BLD CALC: 46 %
HDLC SERPL-MCNC: 53 MG/DL
HGB BLD-MCNC: 15.6 G/DL
IMM GRANULOCYTES NFR BLD AUTO: 0.5 %
LDLC SERPL CALC-MCNC: 112 MG/DL
LYMPHOCYTES # BLD AUTO: 1.43 K/UL
LYMPHOCYTES NFR BLD AUTO: 24.3 %
MAGNESIUM SERPL-MCNC: 2.1 MG/DL
MAN DIFF?: NORMAL
MCHC RBC-ENTMCNC: 31.8 PG
MCHC RBC-ENTMCNC: 33.9 GM/DL
MCV RBC AUTO: 93.7 FL
MONOCYTES # BLD AUTO: 0.37 K/UL
MONOCYTES NFR BLD AUTO: 6.3 %
NEUTROPHILS # BLD AUTO: 3.9 K/UL
NEUTROPHILS NFR BLD AUTO: 66.2 %
NONHDLC SERPL-MCNC: 143 MG/DL
PLATELET # BLD AUTO: 115 K/UL
POTASSIUM SERPL-SCNC: 4.3 MMOL/L
PROT SERPL-MCNC: 7.4 G/DL
PSA SERPL-MCNC: 1.55 NG/ML
RBC # BLD: 4.91 M/UL
RBC # FLD: 12.8 %
SODIUM SERPL-SCNC: 142 MMOL/L
TRIGL SERPL-MCNC: 158 MG/DL
TSH SERPL-ACNC: 1.35 UIU/ML
WBC # FLD AUTO: 5.89 K/UL

## 2023-09-17 ENCOUNTER — INPATIENT (INPATIENT)
Facility: HOSPITAL | Age: 58
LOS: 3 days | Discharge: HOME | DRG: 247 | End: 2023-09-21
Attending: STUDENT IN AN ORGANIZED HEALTH CARE EDUCATION/TRAINING PROGRAM | Admitting: STUDENT IN AN ORGANIZED HEALTH CARE EDUCATION/TRAINING PROGRAM
Payer: COMMERCIAL

## 2023-09-17 VITALS
TEMPERATURE: 98 F | OXYGEN SATURATION: 97 % | DIASTOLIC BLOOD PRESSURE: 92 MMHG | SYSTOLIC BLOOD PRESSURE: 139 MMHG | HEART RATE: 97 BPM | RESPIRATION RATE: 18 BRPM

## 2023-09-17 DIAGNOSIS — I20.9 ANGINA PECTORIS, UNSPECIFIED: ICD-10-CM

## 2023-09-17 DIAGNOSIS — I20.0 UNSTABLE ANGINA: ICD-10-CM

## 2023-09-17 LAB
A1C WITH ESTIMATED AVERAGE GLUCOSE RESULT: 5.7 % — HIGH (ref 4–5.6)
ALBUMIN SERPL ELPH-MCNC: 4.3 G/DL — SIGNIFICANT CHANGE UP (ref 3.3–5.2)
ALLERGY+IMMUNOLOGY DIAG STUDY NOTE: SIGNIFICANT CHANGE UP
ALP SERPL-CCNC: 84 U/L — SIGNIFICANT CHANGE UP (ref 40–120)
ALT FLD-CCNC: 57 U/L — HIGH
ANION GAP SERPL CALC-SCNC: 15 MMOL/L — SIGNIFICANT CHANGE UP (ref 5–17)
APTT BLD: 30.4 SEC — SIGNIFICANT CHANGE UP (ref 24.5–35.6)
AST SERPL-CCNC: 37 U/L — SIGNIFICANT CHANGE UP
BASOPHILS # BLD AUTO: 0.02 K/UL — SIGNIFICANT CHANGE UP (ref 0–0.2)
BASOPHILS NFR BLD AUTO: 0.3 % — SIGNIFICANT CHANGE UP (ref 0–2)
BILIRUB SERPL-MCNC: 0.5 MG/DL — SIGNIFICANT CHANGE UP (ref 0.4–2)
BLD GP AB SCN SERPL QL: SIGNIFICANT CHANGE UP
BUN SERPL-MCNC: 14.3 MG/DL — SIGNIFICANT CHANGE UP (ref 8–20)
CALCIUM SERPL-MCNC: 9.3 MG/DL — SIGNIFICANT CHANGE UP (ref 8.4–10.5)
CHLORIDE SERPL-SCNC: 101 MMOL/L — SIGNIFICANT CHANGE UP (ref 96–108)
CHOLEST SERPL-MCNC: 165 MG/DL — SIGNIFICANT CHANGE UP
CK MB CFR SERPL CALC: 8.6 NG/ML — HIGH (ref 0–6.7)
CK SERPL-CCNC: 214 U/L — HIGH (ref 30–200)
CO2 SERPL-SCNC: 23 MMOL/L — SIGNIFICANT CHANGE UP (ref 22–29)
CREAT SERPL-MCNC: 0.79 MG/DL — SIGNIFICANT CHANGE UP (ref 0.5–1.3)
DIR ANTIGLOB POLYSPECIFIC INTERPRETATION: SIGNIFICANT CHANGE UP
EGFR: 104 ML/MIN/1.73M2 — SIGNIFICANT CHANGE UP
EOSINOPHIL # BLD AUTO: 0.08 K/UL — SIGNIFICANT CHANGE UP (ref 0–0.5)
EOSINOPHIL NFR BLD AUTO: 1.3 % — SIGNIFICANT CHANGE UP (ref 0–6)
ESTIMATED AVERAGE GLUCOSE: 117 MG/DL — HIGH (ref 68–114)
GLUCOSE SERPL-MCNC: 104 MG/DL — HIGH (ref 70–99)
HCT VFR BLD CALC: 44.3 % — SIGNIFICANT CHANGE UP (ref 39–50)
HDLC SERPL-MCNC: 45 MG/DL — SIGNIFICANT CHANGE UP
HGB BLD-MCNC: 15.5 G/DL — SIGNIFICANT CHANGE UP (ref 13–17)
IMM GRANULOCYTES NFR BLD AUTO: 0.3 % — SIGNIFICANT CHANGE UP (ref 0–0.9)
INR BLD: 1.06 RATIO — SIGNIFICANT CHANGE UP (ref 0.85–1.18)
LIPID PNL WITH DIRECT LDL SERPL: 91 MG/DL — SIGNIFICANT CHANGE UP
LYMPHOCYTES # BLD AUTO: 1.31 K/UL — SIGNIFICANT CHANGE UP (ref 1–3.3)
LYMPHOCYTES # BLD AUTO: 21 % — SIGNIFICANT CHANGE UP (ref 13–44)
MCHC RBC-ENTMCNC: 32 PG — SIGNIFICANT CHANGE UP (ref 27–34)
MCHC RBC-ENTMCNC: 35 GM/DL — SIGNIFICANT CHANGE UP (ref 32–36)
MCV RBC AUTO: 91.5 FL — SIGNIFICANT CHANGE UP (ref 80–100)
MONOCYTES # BLD AUTO: 0.4 K/UL — SIGNIFICANT CHANGE UP (ref 0–0.9)
MONOCYTES NFR BLD AUTO: 6.4 % — SIGNIFICANT CHANGE UP (ref 2–14)
NEUTROPHILS # BLD AUTO: 4.4 K/UL — SIGNIFICANT CHANGE UP (ref 1.8–7.4)
NEUTROPHILS NFR BLD AUTO: 70.7 % — SIGNIFICANT CHANGE UP (ref 43–77)
NON HDL CHOLESTEROL: 120 MG/DL — SIGNIFICANT CHANGE UP
PLATELET # BLD AUTO: 130 K/UL — LOW (ref 150–400)
POTASSIUM SERPL-MCNC: 3.9 MMOL/L — SIGNIFICANT CHANGE UP (ref 3.5–5.3)
POTASSIUM SERPL-SCNC: 3.9 MMOL/L — SIGNIFICANT CHANGE UP (ref 3.5–5.3)
PROT SERPL-MCNC: 7.4 G/DL — SIGNIFICANT CHANGE UP (ref 6.6–8.7)
PROTHROM AB SERPL-ACNC: 11.7 SEC — SIGNIFICANT CHANGE UP (ref 9.5–13)
RBC # BLD: 4.84 M/UL — SIGNIFICANT CHANGE UP (ref 4.2–5.8)
RBC # FLD: 12 % — SIGNIFICANT CHANGE UP (ref 10.3–14.5)
SODIUM SERPL-SCNC: 139 MMOL/L — SIGNIFICANT CHANGE UP (ref 135–145)
TRIGL SERPL-MCNC: 144 MG/DL — SIGNIFICANT CHANGE UP
TROPONIN T SERPL-MCNC: 0.06 NG/ML — SIGNIFICANT CHANGE UP (ref 0–0.06)
TROPONIN T SERPL-MCNC: 0.06 NG/ML — SIGNIFICANT CHANGE UP (ref 0–0.06)
TROPONIN T SERPL-MCNC: 0.08 NG/ML — HIGH (ref 0–0.06)
WBC # BLD: 6.23 K/UL — SIGNIFICANT CHANGE UP (ref 3.8–10.5)
WBC # FLD AUTO: 6.23 K/UL — SIGNIFICANT CHANGE UP (ref 3.8–10.5)

## 2023-09-17 PROCEDURE — 71045 X-RAY EXAM CHEST 1 VIEW: CPT | Mod: 26

## 2023-09-17 PROCEDURE — 99223 1ST HOSP IP/OBS HIGH 75: CPT

## 2023-09-17 PROCEDURE — 86077 PHYS BLOOD BANK SERV XMATCH: CPT

## 2023-09-17 PROCEDURE — 99285 EMERGENCY DEPT VISIT HI MDM: CPT

## 2023-09-17 PROCEDURE — 93010 ELECTROCARDIOGRAM REPORT: CPT | Mod: 76

## 2023-09-17 PROCEDURE — 93306 TTE W/DOPPLER COMPLETE: CPT | Mod: 26

## 2023-09-17 RX ORDER — METOPROLOL TARTRATE 50 MG
25 TABLET ORAL EVERY 12 HOURS
Refills: 0 | Status: DISCONTINUED | OUTPATIENT
Start: 2023-09-17 | End: 2023-09-21

## 2023-09-17 RX ORDER — TICAGRELOR 90 MG/1
90 TABLET ORAL EVERY 12 HOURS
Refills: 0 | Status: DISCONTINUED | OUTPATIENT
Start: 2023-09-18 | End: 2023-09-19

## 2023-09-17 RX ORDER — ONDANSETRON 8 MG/1
4 TABLET, FILM COATED ORAL EVERY 8 HOURS
Refills: 0 | Status: DISCONTINUED | OUTPATIENT
Start: 2023-09-17 | End: 2023-09-21

## 2023-09-17 RX ORDER — ASPIRIN/CALCIUM CARB/MAGNESIUM 324 MG
162 TABLET ORAL ONCE
Refills: 0 | Status: COMPLETED | OUTPATIENT
Start: 2023-09-17 | End: 2023-09-17

## 2023-09-17 RX ORDER — ATORVASTATIN CALCIUM 80 MG/1
80 TABLET, FILM COATED ORAL AT BEDTIME
Refills: 0 | Status: DISCONTINUED | OUTPATIENT
Start: 2023-09-17 | End: 2023-09-21

## 2023-09-17 RX ORDER — HEPARIN SODIUM 5000 [USP'U]/ML
4000 INJECTION INTRAVENOUS; SUBCUTANEOUS EVERY 6 HOURS
Refills: 0 | Status: DISCONTINUED | OUTPATIENT
Start: 2023-09-17 | End: 2023-09-17

## 2023-09-17 RX ORDER — LANOLIN ALCOHOL/MO/W.PET/CERES
3 CREAM (GRAM) TOPICAL AT BEDTIME
Refills: 0 | Status: DISCONTINUED | OUTPATIENT
Start: 2023-09-17 | End: 2023-09-21

## 2023-09-17 RX ORDER — HEPARIN SODIUM 5000 [USP'U]/ML
INJECTION INTRAVENOUS; SUBCUTANEOUS
Qty: 25000 | Refills: 0 | Status: DISCONTINUED | OUTPATIENT
Start: 2023-09-17 | End: 2023-09-18

## 2023-09-17 RX ORDER — HEPARIN SODIUM 5000 [USP'U]/ML
INJECTION INTRAVENOUS; SUBCUTANEOUS
Qty: 25000 | Refills: 0 | Status: DISCONTINUED | OUTPATIENT
Start: 2023-09-17 | End: 2023-09-17

## 2023-09-17 RX ORDER — HEPARIN SODIUM 5000 [USP'U]/ML
4000 INJECTION INTRAVENOUS; SUBCUTANEOUS ONCE
Refills: 0 | Status: DISCONTINUED | OUTPATIENT
Start: 2023-09-17 | End: 2023-09-17

## 2023-09-17 RX ORDER — HEPARIN SODIUM 5000 [USP'U]/ML
5000 INJECTION INTRAVENOUS; SUBCUTANEOUS ONCE
Refills: 0 | Status: COMPLETED | OUTPATIENT
Start: 2023-09-17 | End: 2023-09-17

## 2023-09-17 RX ORDER — HEPARIN SODIUM 5000 [USP'U]/ML
5000 INJECTION INTRAVENOUS; SUBCUTANEOUS EVERY 6 HOURS
Refills: 0 | Status: DISCONTINUED | OUTPATIENT
Start: 2023-09-17 | End: 2023-09-18

## 2023-09-17 RX ORDER — ASPIRIN/CALCIUM CARB/MAGNESIUM 324 MG
81 TABLET ORAL DAILY
Refills: 0 | Status: DISCONTINUED | OUTPATIENT
Start: 2023-09-18 | End: 2023-09-21

## 2023-09-17 RX ORDER — TICAGRELOR 90 MG/1
180 TABLET ORAL ONCE
Refills: 0 | Status: COMPLETED | OUTPATIENT
Start: 2023-09-17 | End: 2023-09-17

## 2023-09-17 RX ORDER — ACETAMINOPHEN 500 MG
650 TABLET ORAL EVERY 6 HOURS
Refills: 0 | Status: DISCONTINUED | OUTPATIENT
Start: 2023-09-17 | End: 2023-09-21

## 2023-09-17 RX ADMIN — HEPARIN SODIUM 1000 UNIT(S)/HR: 5000 INJECTION INTRAVENOUS; SUBCUTANEOUS at 20:29

## 2023-09-17 RX ADMIN — TICAGRELOR 180 MILLIGRAM(S): 90 TABLET ORAL at 19:39

## 2023-09-17 RX ADMIN — HEPARIN SODIUM 1000 UNIT(S)/HR: 5000 INJECTION INTRAVENOUS; SUBCUTANEOUS at 18:58

## 2023-09-17 RX ADMIN — Medication 162 MILLIGRAM(S): at 18:45

## 2023-09-17 RX ADMIN — HEPARIN SODIUM 5000 UNIT(S): 5000 INJECTION INTRAVENOUS; SUBCUTANEOUS at 18:58

## 2023-09-17 NOTE — ED ADULT NURSE NOTE - NS_SISCREENINGSR_GEN_ALL_ED
[facility]  Physical Therapy Daily Treatment Note  Date: 2021  Patient Name: Jelani Wallace  : 1949   MRN: [id number]  Referring Provider: Nimco Mae APRN - CNP  [address] [place] [address],  [address] [facility]     Medical Diagnosis: Parkinson's    Outcome Measure: RENE= 52    S: Pt reports compliance with HEP. He feels some improvement in balance. O:  Encouraging  Hip flexion before forward advancement of LE's seems to help pt avoid freezing. Time 09:23-10:22 Asked for 2x/wk for 4 weeks extension   Visit     Pain 0/10    ROM      Modalities     Exercise     Nustep L6 x 8 min Seat 11, arms 11   LAQ     Hamstring Curl alt 2# x 2 min    squats     calf raises 2# x 1 min    Hip abd alt 2# x 2 min 1 hand hold   Hip ext 2# x 2 min    March with AW 2# x 2 min on foam 1 hand hold   THERAPEUTIC ACTIVITIES Large, functional, dynamic, global movements used to build strength, balance, endurance, and flexibility and to improve physical performance. Step-ups - FWD Alt. 6\" x 2 min    Step-ups - LAT 6\" x 2 min, up & over    Step-ups - BWD     Alt lunges 6\" x 2 min     NMR Feedback and cues necessary for developing neuromuscular control. Movement education and guided movement interventions  used to improve performance and control. To improve balance for safe community and home ambulation            March 4 x 20'    Side stepping 4 x 20'    Retro walk heel toe 4 x 20'    Heel to toe 4 x 20 '    Fwd with cones 4 x 20'    Ball push with cane 4 x 20'    A:  Tolerated well. No LOB. P: Continue with rehab plan.   Jailene Meyer PT    Treatment Charges: Mins Units   Initial Evaluation       Re-Evaluation     Ther Exercise         TE 38 2   Manual Therapy     MT     Ther Activities        TA 8 1   Gait Training          GT     Neuro Re-education NR 14 1   Modalities     Non-Billable Service Time     Other     Total Time/Units 60 4 Negative

## 2023-09-17 NOTE — ED ADULT NURSE REASSESSMENT NOTE - NS ED NURSE REASSESS COMMENT FT1
started on IV heparin gtt per hospital protocol via nomogram, on alaris pump. after actual wt obtained, pot updated on plan of care, questions asked and answered. continues to deny cp. cm sr 70's no ectopy noted.

## 2023-09-17 NOTE — CONSULT NOTE ADULT - NS ATTEND AMEND GEN_ALL_CORE FT
Pt with hx MARTI on CPAP presented with on and off CP since yesterday, occurred at exertion x 2 and at rest x1 yesterday. felkt like pressure. Nonradiated. Denied associated signs and symptoms , lasted 15 minutes and resolved spontaneously. He dind;t seek medical attention. He had similar CP at noon time today and went to Urgent Care  EKG at Urgent Care 1:25 pm showed ST elevation inferiorly. in ED, ST abnormalities were resolved and he was free chest discomfort. EKG d/w Dr. Cuello. ACS/UA: will start Heparin, ASA, and Brilinta, STAT Echo. [facility] in am. d/w ER team.

## 2023-09-17 NOTE — ED ADULT NURSE NOTE - NSFALLUNIVINTERV_ED_ALL_ED
Bed/Stretcher in lowest position, wheels locked, appropriate side rails in place/Call bell, personal items and telephone in reach/Instruct patient to call for assistance before getting out of bed/chair/stretcher/Non-slip footwear applied when patient is off stretcher/[place] to call system/Physically safe environment - no spills, clutter or unnecessary equipment/Purposeful proactive rounding/Room/bathroom lighting operational, light cord in reach

## 2023-09-17 NOTE — ED ADULT TRIAGE NOTE - CHIEF COMPLAINT QUOTE
Pt sent in from Urgent care for 2 days of non radiating, intermittent chest pressure. Pt denies N/V, dizziness, HA. Pt with abnormal EKG in hand.

## 2023-09-17 NOTE — CONSULT NOTE ADULT - ASSESSMENT
56 y/o male with good general health who presents to the ER with complaints of chest pressure for 2 days.  Pressure can occur with activity and rest.  Last episode was this am at urgent care and it lasted approx 10 minutes and her was referred to the hospital for evaluation.  He is chest pain free at this time.  Trop x 1 0.06  Ekg no ischemic changes       58 y/o male with good general health who presents to the ER with complaints of chest pressure for 2 days.  Pressure can occur with activity and rest.  Last episode was this am at urgent care and it lasted approx 10 minutes and her was referred to the hospital for evaluation.  He is chest pain free at this time.  Trop x 1 0.06  Ekg no ischemic changes  Ekg at urgent care inf infarct with st elevation         58 y/o male with good general health who presents to the ER with complaints of chest pressure for 2 days.  Pressure can occur with activity and rest.  Last episode was this am at urgent care and it lasted approx 10 minutes and her was referred to the hospital for evaluation.  He is chest pain free at this time.  Trop x 1 0.06  Ekg no ischemic changes  Ekg at urgent care inf infarct with st elevation down on arrival to ER without any chest discomfort

## 2023-09-17 NOTE — H&P ADULT - NSHPPHYSICALEXAM_GEN_ALL_CORE
Vital Signs Last 24 Hrs  T(C): 36.6 (17 Sep 2023 20:38), Max: 36.9 (17 Sep 2023 14:38)  T(F): 97.8 (17 Sep 2023 20:38), Max: 98.4 (17 Sep 2023 14:38)  HR: 80 (17 Sep 2023 20:38) (80 - 97)  BP: 142/86 (17 Sep 2023 20:38) (139/92 - 142/86)  BP(mean): 105 (17 Sep 2023 20:38) (105 - 105)  RR: 16 (17 Sep 2023 20:38) (16 - 18)  SpO2: 97% (17 Sep 2023 20:38) (97% - 98%)    Parameters below as of 17 Sep 2023 20:38  Patient On (Oxygen Delivery Method): room air

## 2023-09-17 NOTE — PATIENT PROFILE ADULT - DO YOU FEEL THREATENED BY OTHERS?
no Quality 431: Preventive Care And Screening: Unhealthy Alcohol Use - Screening: Patient screened for unhealthy alcohol use using a single question and scores less than 2 times per year Quality 226: Preventive Care And Screening: Tobacco Use: Screening And Cessation Intervention: Patient screened for tobacco use and is an ex/non-smoker Detail Level: Detailed Quality 110: Preventive Care And Screening: Influenza Immunization: Influenza Immunization Administered during Influenza season

## 2023-09-17 NOTE — ED PROVIDER NOTE - PHYSICAL EXAMINATION
Gen: no acute distress  Head: normocephalic, atraumatic  EENT: EOMI  Lung: no increased work of breathing, CTABL  CV: normal s1/s2, rrr, 2+ radial pulses b/l, no LE edema  Abd: soft, non-tender, non-distended, no rebound tenderness or guarding  MSK: no visible deformities, full range of motion in all 4 extremities; pain not reproducible to palpation  Neuro: A&Ox4; No focal neurologic deficits

## 2023-09-17 NOTE — H&P ADULT - TIME BILLING
Chart, labs and imaging reviewed. Physical examination and documentation. Discussion with patient and wife at bed side.

## 2023-09-17 NOTE — ED ADULT NURSE NOTE - OBJECTIVE STATEMENT
Pt ref in from Urgent care with c/o  sub sternal chest pain / pressure x 2 days. denies rad, nausea, sob or diaph. denies trauma or heavy lifting recently. bilateral clear breath sounds, abd soft nontender, no pedal edema. currently cp free.

## 2023-09-17 NOTE — ED PROVIDER NOTE - OBJECTIVE STATEMENT
57-year-old male with no significant medical history presenting with 2 days of intermittent chest pressure.  Patient reports that the pain is midsternal, without radiation, and resolves after a couple of minutes.  Pain is not worse with exertion and is not associated with dyspnea, nausea, syncope.  Does report some mild anxiety when he has the chest pressure.  He denies ever having prior chest pain denies any cardiac issues.  Denies family history of heart issues.  Denies history of blood clots, prolonged immobility, recent surgeries, hormone use, leg pain. Patient was given 162mg ASA at urgent care today.

## 2023-09-17 NOTE — H&P ADULT - ASSESSMENT
58 y/o male with no significant PMH was sent to the from urgent care for evaluation of chest pain. Patient reported 2 days of intermittent midsternal chest pressure at rest. Reported to have ECG with inferior infarct and ST elevation in urgent care, given Aspirin 162mg and sent to the ED.  In the ED, troponin: 0.06; ECG: NSR.       NSTEMI   Admit to telemetry   Troponin trending up: 0.06--->0.06----> 0.08  On Heparin drip, monitor aPTT as per protocol   Cardiology on board   Plan for cardiac cath in AM   Aspirin 81mg   Brilinta 90mg bid   Atorvastatin 80mg   Metoprolol tartrate 25mg bid with holding parameters     Pre-DM  HbA1C: 5.7  Lifestyle modification     Supportive   DVT prophylaxis: on Heparin drip   Diet: NPO after midnight     Plan of care discussed with patient and wife at bed side.

## 2023-09-17 NOTE — CONSULT NOTE ADULT - SUBJECTIVE AND OBJECTIVE BOX
[facility]                                              CARDIOLOGY AT [place] [address]                                             Telephone: [phone]. Fax:[phone]                                                         CARDIOLOGY CONSULTATION NOTE                                                                                             Consult requested by:  Jaden Garcia  History obtained by: Patient and medical record  Community Cardiologist: None   obtained: Yes [  ] No [ x ]  Reason for Consultation: Chest pain  Avialable out pt records reviewed: Yes [  ] No [x  ]    This is a 56 y/o male with good general health who presents to the ER with complaints of chest pressure for 2 days.  Pressure can occur with activity and rest.  Last episode was this am at urgent care and it lasted approx 10 minutes and her was referred to the hospital for evaluation.  He is chest pain free at this time.  Trop x 1 0.06  Ekg no ischemic changes  CARDIOVASCULAR: + chest pain, no dyspnea, no syncope/presyncope, no palpitations, no dizziness, no Orthopnea, no Paroxsymal nocturnal dyspnea    CARDIAC TESTING   ECHO:  PENDING    PAST MEDICAL HISTORY  None    PAST SURGICAL HISTORY  None    SOCIAL HISTORY:  Denies smoking/alcohol/drugs    FAMILY HISTORY:  Family History of Cardiovascular Disease:  Yes [x  ] No [  ]  Coronary Artery Disease in first degree relative: Yes [  ] No [ x ]  Sudden Cardiac Death in First degree relative: Yes [  ] No [x ]      HOME MEDICATIONS:  None    CURRENT MEDICATIONS:  aspirin  chewable    ALLERGIES:  NKDA    REVIEW OF SYMPTOMS:   CONSTITUTIONAL: No fever, no chills, no weight loss, no weight gain, no fatigue   ENMT:  No vertigo; No sinus or throat pain  NECK: No pain or stiffness  RESPIRATORY: no Shortness of breath, no cough, no wheezing  : No dysuria, no hematuria   GI: No dark color stool, no nausea, no diarrhea, no constipation, no abdominal pain   NEURO: No headache, no slurred speech   MUSCULOSKELETAL: No joint pain or swelling; No muscle, back, or extremity pain  PSYCH: No agitation, no anxiety.    ALL OTHER REVIEW OF SYSTEMS ARE NEGATIVE.      Vital Signs Last 24 Hrs  T(C): 36.9 (17 Sep 2023 14:38), Max: 36.9 (17 Sep 2023 14:38)  T(F): 98.4 (17 Sep 2023 14:38), Max: 98.4 (17 Sep 2023 14:38)  HR: 97 (17 Sep 2023 14:38) (97 - 97)  BP: 139/92 (17 Sep 2023 14:38) (139/92 - 139/92)  BP(mean): --  RR: 18 (17 Sep 2023 14:38) (18 - 18)  SpO2: 97% (17 Sep 2023 14:38) (97% - 97%)    Parameters below as of 17 Sep 2023 14:38  Patient On (Oxygen Delivery Method): room air    INTAKE AND OUTPUT:     PHYSICAL EXAM:  Constitutional: Comfortable . No acute distress.   HEENT: Atraumatic and normocephalic , neck is supple . no JVD. No carotid bruit.  CNS: A&Ox3. No focal deficits.   Respiratory: CTAB, unlabored   Cardiovascular: RRR normal s1 s2. No murmur. No rubs or gallop.  Gastrointestinal: Soft, non-tender. +Bowel sounds.   MSK: full ROM extremities x 4  Extremities: No edema. No cyanosis   Psychiatric: Calm . no agitation.   Skin: Warm and dry, no ulcers on extremities       LABS:                        15.5   6.23  )-----------( 130      ( 17 Sep 2023 15:50 )             44.3     09-17    139  |  101  |  14.3  ----------------------------<  104<H>  3.9   |  23.0  |  0.79    Ca    9.3      17 Sep 2023 15:50    TPro  7.4  /  Alb  4.3  /  TBili  0.5  /  DBili  x   /  AST  37  /  ALT  57<H>  /  AlkPhos  84  09-17    CARDIAC MARKERS ( 17 Sep 2023 15:50 )  x     / 0.06 ng/mL / x     / x     / x        ;p-BNP=  PT/INR - ( 17 Sep 2023 15:50 )   PT: 11.7 sec;   INR: 1.06 ratio    PTT - ( 17 Sep 2023 15:50 )  PTT:30.4 sec  Urinalysis Basic - ( 17 Sep 2023 15:50 )  Color: x / Appearance: x / SG: x / pH: x  Gluc: 104 mg/dL / Ketone: x  / Bili: x / Urobili: x   Blood: x / Protein: x / Nitrite: x   Leuk Esterase: x / RBC: x / WBC x   Sq Epi: x / Non Sq Epi: x / Bacteria: x    INTERPRETATION OF TELEMETRY:     ECG: NSR No ischemic changes  Prior ECG: Yes [  ] No [  ]      RADIOLOGY & ADDITIONAL STUDIES:    X-ray:  reviewed by me. No infiltrate or chf  poor inspiratory film                                                  [facility]                                              CARDIOLOGY AT [place] [address]                                             Telephone: [phone]. Fax:[phone]                                                         CARDIOLOGY CONSULTATION NOTE                                                                                             Consult requested by:  Jaden Garcia  History obtained by: Patient and medical record  Community Cardiologist: None   obtained: Yes [  ] No [ x ]  Reason for Consultation: Chest pain  Avialable out pt records reviewed: Yes [  ] No [x  ]    This is a 56 y/o male with good general health who presents to the ER with complaints of chest pressure for 2 days.  Pressure can occur with activity and rest.  Last episode was this am at urgent care and it lasted approx 10 minutes and her was referred to the hospital for evaluation.  He is chest pain free at this time.  Trop x 1 0.06  Ekg no ischemic changes  CARDIOVASCULAR: + chest pain, no dyspnea, no syncope/presyncope, no palpitations, no dizziness, no Orthopnea, no Paroxsymal nocturnal dyspnea  Ekg at urgent care inf infarct with st elevation    CARDIAC TESTING   ECHO:  PENDING    PAST MEDICAL HISTORY  None    PAST SURGICAL HISTORY  None    SOCIAL HISTORY:  Denies smoking/alcohol/drugs    FAMILY HISTORY:  Family History of Cardiovascular Disease:  Yes [x  ] No [  ]  Coronary Artery Disease in first degree relative: Yes [  ] No [ x ]  Sudden Cardiac Death in First degree relative: Yes [  ] No [x ]      HOME MEDICATIONS:  None    CURRENT MEDICATIONS:  aspirin  chewable    ALLERGIES:  NKDA    REVIEW OF SYMPTOMS:   CONSTITUTIONAL: No fever, no chills, no weight loss, no weight gain, no fatigue   ENMT:  No vertigo; No sinus or throat pain  NECK: No pain or stiffness  RESPIRATORY: no Shortness of breath, no cough, no wheezing  : No dysuria, no hematuria   GI: No dark color stool, no nausea, no diarrhea, no constipation, no abdominal pain   NEURO: No headache, no slurred speech   MUSCULOSKELETAL: No joint pain or swelling; No muscle, back, or extremity pain  PSYCH: No agitation, no anxiety.    ALL OTHER REVIEW OF SYSTEMS ARE NEGATIVE.      Vital Signs Last 24 Hrs  T(C): 36.9 (17 Sep 2023 14:38), Max: 36.9 (17 Sep 2023 14:38)  T(F): 98.4 (17 Sep 2023 14:38), Max: 98.4 (17 Sep 2023 14:38)  HR: 97 (17 Sep 2023 14:38) (97 - 97)  BP: 139/92 (17 Sep 2023 14:38) (139/92 - 139/92)  BP(mean): --  RR: 18 (17 Sep 2023 14:38) (18 - 18)  SpO2: 97% (17 Sep 2023 14:38) (97% - 97%)    Parameters below as of 17 Sep 2023 14:38  Patient On (Oxygen Delivery Method): room air    INTAKE AND OUTPUT:     PHYSICAL EXAM:  Constitutional: Comfortable . No acute distress.   HEENT: Atraumatic and normocephalic , neck is supple . no JVD. No carotid bruit.  CNS: A&Ox3. No focal deficits.   Respiratory: CTAB, unlabored   Cardiovascular: RRR normal s1 s2. No murmur. No rubs or gallop.  Gastrointestinal: Soft, non-tender. +Bowel sounds.   MSK: full ROM extremities x 4  Extremities: No edema. No cyanosis   Psychiatric: Calm . no agitation.   Skin: Warm and dry, no ulcers on extremities       LABS:                        15.5   6.23  )-----------( 130      ( 17 Sep 2023 15:50 )             44.3     09-17    139  |  101  |  14.3  ----------------------------<  104<H>  3.9   |  23.0  |  0.79    Ca    9.3      17 Sep 2023 15:50    TPro  7.4  /  Alb  4.3  /  TBili  0.5  /  DBili  x   /  AST  37  /  ALT  57<H>  /  AlkPhos  84  09-17    CARDIAC MARKERS ( 17 Sep 2023 15:50 )  x     / 0.06 ng/mL / x     / x     / x        ;p-BNP=  PT/INR - ( 17 Sep 2023 15:50 )   PT: 11.7 sec;   INR: 1.06 ratio    PTT - ( 17 Sep 2023 15:50 )  PTT:30.4 sec  Urinalysis Basic - ( 17 Sep 2023 15:50 )  Color: x / Appearance: x / SG: x / pH: x  Gluc: 104 mg/dL / Ketone: x  / Bili: x / Urobili: x   Blood: x / Protein: x / Nitrite: x   Leuk Esterase: x / RBC: x / WBC x   Sq Epi: x / Non Sq Epi: x / Bacteria: x    INTERPRETATION OF TELEMETRY:     ECG: NSR No ischemic changes  Prior ECG: Yes [  ] No [  ]      RADIOLOGY & ADDITIONAL STUDIES:    X-ray:  reviewed by me. No infiltrate or chf  poor inspiratory film

## 2023-09-17 NOTE — PATIENT PROFILE ADULT - FALL HARM RISK - UNIVERSAL INTERVENTIONS
Bed in lowest position, wheels locked, appropriate side rails in place/Call bell, personal items and telephone in reach/Instruct patient to call for assistance before getting out of bed or chair/Non-slip footwear when patient is out of bed/[place] to call system/Physically safe environment - no spills, clutter or unnecessary equipment/Purposeful Proactive Rounding/Room/bathroom lighting operational, light cord in reach

## 2023-09-17 NOTE — H&P ADULT - NSICDXFAMILYHX_GEN_ALL_CORE_FT
FAMILY HISTORY:  Mother  Still living? Unknown  FH: CVA (cerebrovascular accident), Age at diagnosis: Age Unknown

## 2023-09-17 NOTE — CONSULT NOTE ADULT - PROBLEM SELECTOR RECOMMENDATION 9
Trop mildly positive  trend trop  Start asa, statin and metoprolol  check lipid panel and hgb aic  Telemetry monitoring   Start asa, statin and metoprolol  Echo to assess wall motion abnormalities  will plan on left heart cath Trop mildly positive  trend trop  Start asa, statin and metoprolol  check lipid panel and hgb aic  Telemetry monitoring   Start asa, statin and metoprolol  Echo to assess wall motion abnormalities  Start heparin if chest pain reoccurs or if second trop is positive  will plan on left heart cath Unstable angina  Trop mildly positive  trend trop  Ekg at urgent care with inf st elevations which have resolved  Start asa, statin and metoprolol  Iv heparin   check lipid panel and hgb aic  Telemetry monitoring   Start asa, statin and metoprolol  Echo to assess wall motion abnormalities  will plan on left heart cath in am ACS/Unstable angina  Trop #1 = 0.06  trend trop  Ekg at urgent care with inf st elevations which have resolved in ED  Start asa, statin and metoprolol  Iv heparin   check lipid panel and hgb aic  Telemetry monitoring   Start asa, statin and metoprolol  Echo to assess wall motion abnormalities  will plan on left heart cath in am

## 2023-09-17 NOTE — PATIENT PROFILE ADULT - PUBLIC BENEFITS
decreased fetal movement x 1 1/2 days.  Denies contractions  denies leaking fluid  denies vagianl bleeding
no

## 2023-09-17 NOTE — ED PROVIDER NOTE - ATTENDING CONTRIBUTION TO CARE
Pt states that for the past few days he has had intermittent chest pressure. Pt states that it comes on with walking and once at rest.  no sob. no n/v. no sweating. Pt went to urgent care and had an abnormal EKG and was sent to the ER.    physical - rrr. ctab. abd - soft, nt. no edema. no rash.    plan - labs and imaging reviewed. EKG with borderline MOY at  but no ST elevations on any ekg here.  case d/w cards recommended brilinta, asa, heparin and admission.

## 2023-09-17 NOTE — ED PROVIDER NOTE - CLINICAL SUMMARY MEDICAL DECISION MAKING FREE TEXT BOX
57-year-old male with no significant medical history presenting with 2 days of intermittent chest pressure. Patient without pain at this time. Pain started earlier this morning and resolved while waiting to be seen at urgent care. No known risk factors for cardiac disease. Will check trop x2, basic labs, CXR and reassess. Will give additional 162mg ASA today.

## 2023-09-17 NOTE — ED PROVIDER NOTE - PROGRESS NOTE DETAILS
Patient denies chest pain at this time. Trop 0.06. Card consult placed. Will send repeat trop and obtain repeat EKG ~2 hours from last. Anisa Stanley MD PGY-3 Patient still without chest pain. Seen by cards. Will admit to telemetry, start heparin drip and give brillinta. Patient to be go for cardiac cath tomorrow. Anisa Stanley MD PGY-3

## 2023-09-17 NOTE — H&P ADULT - HISTORY OF PRESENT ILLNESS
56 y/o male with no significant PMH was sent to the from urgent care for evaluation of chest pain. Patient reported 2 days of intermittent midsternal chest pressure at rest. Non-radiating, no nausea, vomiting, diaphoresis, palpitation, shortness of breath, prior hx. Reported to have ECG with inferior infarct and ST elevation in urgent care, given Aspirin 162mg and sent to the ED. No recent travel, calf pain.

## 2023-09-18 DIAGNOSIS — I20.0 UNSTABLE ANGINA: ICD-10-CM

## 2023-09-18 LAB
ANION GAP SERPL CALC-SCNC: 13 MMOL/L — SIGNIFICANT CHANGE UP (ref 5–17)
APTT BLD: 125.5 SEC — CRITICAL HIGH (ref 24.5–35.6)
BUN SERPL-MCNC: 11.8 MG/DL — SIGNIFICANT CHANGE UP (ref 8–20)
CALCIUM SERPL-MCNC: 9.1 MG/DL — SIGNIFICANT CHANGE UP (ref 8.4–10.5)
CHLORIDE SERPL-SCNC: 100 MMOL/L — SIGNIFICANT CHANGE UP (ref 96–108)
CO2 SERPL-SCNC: 24 MMOL/L — SIGNIFICANT CHANGE UP (ref 22–29)
CREAT SERPL-MCNC: 0.88 MG/DL — SIGNIFICANT CHANGE UP (ref 0.5–1.3)
EGFR: 101 ML/MIN/1.73M2 — SIGNIFICANT CHANGE UP
GLUCOSE SERPL-MCNC: 109 MG/DL — HIGH (ref 70–99)
HCT VFR BLD CALC: 45.4 % — SIGNIFICANT CHANGE UP (ref 39–50)
HGB BLD-MCNC: 15.2 G/DL — SIGNIFICANT CHANGE UP (ref 13–17)
MCHC RBC-ENTMCNC: 30.7 PG — SIGNIFICANT CHANGE UP (ref 27–34)
MCHC RBC-ENTMCNC: 33.5 GM/DL — SIGNIFICANT CHANGE UP (ref 32–36)
MCV RBC AUTO: 91.7 FL — SIGNIFICANT CHANGE UP (ref 80–100)
PLATELET # BLD AUTO: 115 K/UL — LOW (ref 150–400)
POTASSIUM SERPL-MCNC: 3.8 MMOL/L — SIGNIFICANT CHANGE UP (ref 3.5–5.3)
POTASSIUM SERPL-SCNC: 3.8 MMOL/L — SIGNIFICANT CHANGE UP (ref 3.5–5.3)
RBC # BLD: 4.95 M/UL — SIGNIFICANT CHANGE UP (ref 4.2–5.8)
RBC # FLD: 12.1 % — SIGNIFICANT CHANGE UP (ref 10.3–14.5)
SODIUM SERPL-SCNC: 137 MMOL/L — SIGNIFICANT CHANGE UP (ref 135–145)
WBC # BLD: 7.1 K/UL — SIGNIFICANT CHANGE UP (ref 3.8–10.5)
WBC # FLD AUTO: 7.1 K/UL — SIGNIFICANT CHANGE UP (ref 3.8–10.5)

## 2023-09-18 PROCEDURE — 93458 L HRT ARTERY/VENTRICLE ANGIO: CPT | Mod: 26,59

## 2023-09-18 PROCEDURE — 99152 MOD SED SAME PHYS/QHP 5/>YRS: CPT

## 2023-09-18 PROCEDURE — 99233 SBSQ HOSP IP/OBS HIGH 50: CPT

## 2023-09-18 PROCEDURE — 93010 ELECTROCARDIOGRAM REPORT: CPT

## 2023-09-18 PROCEDURE — 99223 1ST HOSP IP/OBS HIGH 75: CPT | Mod: 25

## 2023-09-18 PROCEDURE — 92928 PRQ TCAT PLMT NTRAC ST 1 LES: CPT | Mod: RC

## 2023-09-18 RX ORDER — LISINOPRIL 2.5 MG/1
2.5 TABLET ORAL DAILY
Refills: 0 | Status: DISCONTINUED | OUTPATIENT
Start: 2023-09-18 | End: 2023-09-21

## 2023-09-18 RX ORDER — TICAGRELOR 90 MG/1
1 TABLET ORAL
Qty: 180 | Refills: 1
Start: 2023-09-18 | End: 2024-03-15

## 2023-09-18 RX ORDER — HEPARIN SODIUM 5000 [USP'U]/ML
5000 INJECTION INTRAVENOUS; SUBCUTANEOUS EVERY 12 HOURS
Refills: 0 | Status: DISCONTINUED | OUTPATIENT
Start: 2023-09-18 | End: 2023-09-21

## 2023-09-18 RX ORDER — SODIUM CHLORIDE 9 MG/ML
250 INJECTION INTRAMUSCULAR; INTRAVENOUS; SUBCUTANEOUS ONCE
Refills: 0 | Status: COMPLETED | OUTPATIENT
Start: 2023-09-18 | End: 2023-09-18

## 2023-09-18 RX ADMIN — ATORVASTATIN CALCIUM 80 MILLIGRAM(S): 80 TABLET, FILM COATED ORAL at 22:05

## 2023-09-18 RX ADMIN — Medication 81 MILLIGRAM(S): at 07:41

## 2023-09-18 RX ADMIN — TICAGRELOR 90 MILLIGRAM(S): 90 TABLET ORAL at 17:02

## 2023-09-18 RX ADMIN — SODIUM CHLORIDE 500 MILLILITER(S): 9 INJECTION INTRAMUSCULAR; INTRAVENOUS; SUBCUTANEOUS at 11:45

## 2023-09-18 RX ADMIN — Medication 25 MILLIGRAM(S): at 05:32

## 2023-09-18 RX ADMIN — TICAGRELOR 90 MILLIGRAM(S): 90 TABLET ORAL at 05:32

## 2023-09-18 RX ADMIN — HEPARIN SODIUM 0 UNIT(S)/HR: 5000 INJECTION INTRAVENOUS; SUBCUTANEOUS at 02:10

## 2023-09-18 RX ADMIN — HEPARIN SODIUM 5000 UNIT(S): 5000 INJECTION INTRAVENOUS; SUBCUTANEOUS at 17:02

## 2023-09-18 RX ADMIN — Medication 25 MILLIGRAM(S): at 17:02

## 2023-09-18 RX ADMIN — SODIUM CHLORIDE 500 MILLILITER(S): 9 INJECTION INTRAMUSCULAR; INTRAVENOUS; SUBCUTANEOUS at 13:31

## 2023-09-18 NOTE — PROGRESS NOTE ADULT - ASSESSMENT
56 y/o male with no significant PMH was sent to the from urgent care for evaluation of chest pain. Patient reported 2 days of intermittent midsternal chest pressure at rest. Reported to have ECG with inferior infarct and ST elevation in urgent care, given Aspirin 162mg and sent to the ED.  In the ED, troponin: 0.06; ECG: NSR.     Unstable angina  - trop trend 0.06 > 0.06 > 0.08  - pt without chest pain  - ECG without ischemic changes x2  - TTE w/ EF 50-55% and no WMA  - cardio following  - c/w asa and brilinta  - d/c heparin ggt  - c/w metoprolol and statin  - start lisinopril 2.5mg qd  - s/p [facility] (9/18) w/ PCI to RPDL  - cardio plan for PCI to LAD on wednesday  - c/w tele monitoring    Pre-DM  - pt advised on lifestyle modifications following discharge    DVT ppx: heparin

## 2023-09-18 NOTE — PROGRESS NOTE ADULT - SUBJECTIVE AND OBJECTIVE BOX
[facility] Division of Medicine    Chief Complaint:      SUBJECTIVE / OVERNIGHT EVENTS: Pt seen at the bedside. States they are feeling improved from day prior.  Patient denies chest pain, SOB, abd pain, N/V, fever, chills, dysuria or any other complaints. All remainder ROS negative.     MEDICATIONS  (STANDING):  aspirin  chewable 81 milliGRAM(s) Oral daily  atorvastatin 80 milliGRAM(s) Oral at bedtime  heparin  Infusion.  Unit(s)/Hr (10 mL/Hr) IV Continuous <Continuous>  metoprolol tartrate 25 milliGRAM(s) Oral every 12 hours  sodium chloride 0.9% Bolus 250 milliLiter(s) IV Bolus once  sodium chloride 0.9% Bolus 250 milliLiter(s) IV Bolus once  ticagrelor 90 milliGRAM(s) Oral every 12 hours    MEDICATIONS  (PRN):  acetaminophen     Tablet .. 650 milliGRAM(s) Oral every 6 hours PRN Temp greater or equal to 38C (100.4F), Mild Pain (1 - 3)  aluminum hydroxide/magnesium hydroxide/simethicone Suspension 30 milliLiter(s) Oral every 4 hours PRN Dyspepsia  heparin   Injectable 5000 Unit(s) IV Push every 6 hours PRN For aPTT less than 40  melatonin 3 milliGRAM(s) Oral at bedtime PRN Insomnia  ondansetron Injectable 4 milliGRAM(s) IV Push every 8 hours PRN Nausea and/or Vomiting      I&O's Summary      PHYSICAL EXAM:  Vital Signs Last 24 Hrs  T(C): 36.6 (18 Sep 2023 07:55), Max: 36.9 (17 Sep 2023 14:38)  T(F): 97.9 (18 Sep 2023 07:55), Max: 98.4 (17 Sep 2023 14:38)  HR: 71 (18 Sep 2023 12:48) (64 - 97)  BP: 124/82 (18 Sep 2023 12:48) (106/68 - 142/86)  BP(mean): 105 (17 Sep 2023 20:38) (105 - 105)  RR: 14 (18 Sep 2023 12:48) (14 - 18)  SpO2: 97% (18 Sep 2023 12:48) (97% - 99%)    Parameters below as of 18 Sep 2023 10:18  Patient On (Oxygen Delivery Method): room air          GENERAL: not in acute distress  HEENT:  Clear conjunctiva, PERRL,  EOMI, moist oral mucosa no pharyngeal injection or exudates, no cervical LAD  RESP:  Non-labored breathing pattern, lungs clear to ausculation, no wheezes or crackles appreciated  CV: Regular rate and rhythm, no murmurs appreciated, no lower extremity edema, peripheral pulses are 2+ bilaterally  GI: Soft, non-tender, non-distended  NEURO: Awake, alert, conversant, upper and lower extremity strength 5/5, light touch sensation grossly intact  PSYCH: Calm, cooperative, A&Ox3  SKIN: No rash or lesions, warm and dry      LABS:                        15.2   7.10  )-----------( 115      ( 18 Sep 2023 01:17 )             45.4     09-18    137  |  100  |  11.8  ----------------------------<  109<H>  3.8   |  24.0  |  0.88    Ca    9.1      18 Sep 2023 01:17    TPro  7.4  /  Alb  4.3  /  TBili  0.5  /  DBili  x   /  AST  37  /  ALT  57<H>  /  AlkPhos  84  09-17    PT/INR - ( 17 Sep 2023 15:50 )   PT: 11.7 sec;   INR: 1.06 ratio         PTT - ( 18 Sep 2023 01:17 )  PTT:125.5 sec  CARDIAC MARKERS ( 17 Sep 2023 19:46 )  x     / 0.08 ng/mL / x     / x     / x      CARDIAC MARKERS ( 17 Sep 2023 18:55 )  x     / 0.06 ng/mL / 214 U/L / x     / 8.6 ng/mL  CARDIAC MARKERS ( 17 Sep 2023 15:50 )  x     / 0.06 ng/mL / x     / x     / x          Urinalysis Basic - ( 18 Sep 2023 01:17 )    Color: x / Appearance: x / SG: x / pH: x  Gluc: 109 mg/dL / Ketone: x  / Bili: x / Urobili: x   Blood: x / Protein: x / Nitrite: x   Leuk Esterase: x / RBC: x / WBC x   Sq Epi: x / Non Sq Epi: x / Bacteria: x        CAPILLARY BLOOD GLUCOSE          IMAGING:                                   [facility] Division of Medicine    Chief Complaint:  chest pain    SUBJECTIVE / OVERNIGHT EVENTS: Pt seen at the bedside. States they are feeling improved from day prior.  Patient denies chest pain, SOB, abd pain, N/V, fever, chills, dysuria or any other complaints. All remainder ROS negative.     MEDICATIONS  (STANDING):  aspirin  chewable 81 milliGRAM(s) Oral daily  atorvastatin 80 milliGRAM(s) Oral at bedtime  heparin  Infusion.  Unit(s)/Hr (10 mL/Hr) IV Continuous <Continuous>  metoprolol tartrate 25 milliGRAM(s) Oral every 12 hours  sodium chloride 0.9% Bolus 250 milliLiter(s) IV Bolus once  sodium chloride 0.9% Bolus 250 milliLiter(s) IV Bolus once  ticagrelor 90 milliGRAM(s) Oral every 12 hours    MEDICATIONS  (PRN):  acetaminophen     Tablet .. 650 milliGRAM(s) Oral every 6 hours PRN Temp greater or equal to 38C (100.4F), Mild Pain (1 - 3)  aluminum hydroxide/magnesium hydroxide/simethicone Suspension 30 milliLiter(s) Oral every 4 hours PRN Dyspepsia  heparin   Injectable 5000 Unit(s) IV Push every 6 hours PRN For aPTT less than 40  melatonin 3 milliGRAM(s) Oral at bedtime PRN Insomnia  ondansetron Injectable 4 milliGRAM(s) IV Push every 8 hours PRN Nausea and/or Vomiting      I&O's Summary      PHYSICAL EXAM:  Vital Signs Last 24 Hrs  T(C): 36.6 (18 Sep 2023 07:55), Max: 36.9 (17 Sep 2023 14:38)  T(F): 97.9 (18 Sep 2023 07:55), Max: 98.4 (17 Sep 2023 14:38)  HR: 71 (18 Sep 2023 12:48) (64 - 97)  BP: 124/82 (18 Sep 2023 12:48) (106/68 - 142/86)  BP(mean): 105 (17 Sep 2023 20:38) (105 - 105)  RR: 14 (18 Sep 2023 12:48) (14 - 18)  SpO2: 97% (18 Sep 2023 12:48) (97% - 99%)    Parameters below as of 18 Sep 2023 10:18  Patient On (Oxygen Delivery Method): room air          GENERAL: not in acute distress  HEENT:  Clear conjunctiva, PERRL,  EOMI, moist oral mucosa no pharyngeal injection or exudates, no cervical LAD  RESP:  Non-labored breathing pattern, lungs clear to ausculation, no wheezes or crackles appreciated  CV: Regular rate and rhythm, no murmurs appreciated, no lower extremity edema, peripheral pulses are 2+ bilaterally  GI: Soft, non-tender, non-distended  NEURO: Awake, alert, conversant, upper and lower extremity strength 5/5, light touch sensation grossly intact  PSYCH: Calm, cooperative, A&Ox3  SKIN: No rash or lesions, warm and dry      LABS:                        15.2   7.10  )-----------( 115      ( 18 Sep 2023 01:17 )             45.4     09-18    137  |  100  |  11.8  ----------------------------<  109<H>  3.8   |  24.0  |  0.88    Ca    9.1      18 Sep 2023 01:17    TPro  7.4  /  Alb  4.3  /  TBili  0.5  /  DBili  x   /  AST  37  /  ALT  57<H>  /  AlkPhos  84  09-17    PT/INR - ( 17 Sep 2023 15:50 )   PT: 11.7 sec;   INR: 1.06 ratio         PTT - ( 18 Sep 2023 01:17 )  PTT:125.5 sec  CARDIAC MARKERS ( 17 Sep 2023 19:46 )  x     / 0.08 ng/mL / x     / x     / x      CARDIAC MARKERS ( 17 Sep 2023 18:55 )  x     / 0.06 ng/mL / 214 U/L / x     / 8.6 ng/mL  CARDIAC MARKERS ( 17 Sep 2023 15:50 )  x     / 0.06 ng/mL / x     / x     / x          Urinalysis Basic - ( 18 Sep 2023 01:17 )    Color: x / Appearance: x / SG: x / pH: x  Gluc: 109 mg/dL / Ketone: x  / Bili: x / Urobili: x   Blood: x / Protein: x / Nitrite: x   Leuk Esterase: x / RBC: x / WBC x   Sq Epi: x / Non Sq Epi: x / Bacteria: x        CAPILLARY BLOOD GLUCOSE          IMAGING:

## 2023-09-18 NOTE — PROGRESS NOTE ADULT - SUBJECTIVE AND OBJECTIVE BOX
[facility]                                                         CARDIOLOGY AT [facility]                                                                  [address], [place] [address]                                                         Telephone: [phone]. Fax:[phone]                                                                             PROGRESS NOTE    Reason for follow up: Chest Pain  Update: s/p LHC, PCI to RCA. Plan for intervention to LAD Wednesday.        Review of symptoms:   Cardiac:  No chest pain. No dyspnea. No palpitations.  Respiratory: no cough. No dyspnea  Gastrointestinal: No diarrhea. No abdominal pain. No bleeding.   Neuro: No focal neuro complaints.        Vitals:  T(C): 36.6 (09-18-23 @ 07:55), Max: 36.9 (09-17-23 @ 14:38)  HR: 77 (09-18-23 @ 11:48) (64 - 97)  BP: 118/80 (09-18-23 @ 11:48) (106/68 - 142/86)  RR: 16 (09-18-23 @ 11:48) (16 - 18)  SpO2: 98% (09-18-23 @ 11:48) (97% - 99%)        Weight (kg): 85.3 (09-18 @ 07:55)        PHYSICAL EXAM:  Appearance: Comfortable. No acute distress  HEENT:  Atraumatic. Normocephalic.  Normal oral mucosa  Neurologic: A & O x 3, no gross focal deficits.  Cardiovascular: RRR S1 S2, No murmur, no rubs/gallops. No JVD  Respiratory: Lungs clear to auscultation, unlabored   Gastrointestinal:  Soft, Non-tender, + BS  Lower Extremities: No edema  Psychiatry: Patient is calm. No agitation.   Skin: warm and dry.        CURRENT CARDIAC MEDICATIONS:  metoprolol tartrate 25 milliGRAM(s) Oral every 12 hours        CURRENT OTHER MEDICATIONS:  acetaminophen     Tablet .. 650 milliGRAM(s) Oral every 6 hours PRN Temp greater or equal to 38C (100.4F), Mild Pain (1 - 3)  melatonin 3 milliGRAM(s) Oral at bedtime PRN Insomnia  ondansetron Injectable 4 milliGRAM(s) IV Push every 8 hours PRN Nausea and/or Vomiting  aluminum hydroxide/magnesium hydroxide/simethicone Suspension 30 milliLiter(s) Oral every 4 hours PRN Dyspepsia  atorvastatin 80 milliGRAM(s) Oral at bedtime  aspirin  chewable 81 milliGRAM(s) Oral daily  heparin   Injectable 5000 Unit(s) IV Push every 6 hours PRN For aPTT less than 40  heparin  Infusion.  Unit(s)/Hr (10 mL/Hr) IV Continuous <Continuous>  sodium chloride 0.9% Bolus 250 milliLiter(s) IV Bolus once, Stop order after: 1 Doses  sodium chloride 0.9% Bolus 250 milliLiter(s) IV Bolus once, Stop order after: 1 Doses  ticagrelor 90 milliGRAM(s) Oral every 12 hours        LABS:	 	  ( 17 Sep 2023 19:46 )  Troponin T  0.08<H>,  CPK  X    , CKMB  X    , BNP X      , ( 17 Sep 2023 18:55 )  Troponin T  0.06 ,  CPK  214<H>, CKMB  X    , BNP X      , ( 17 Sep 2023 15:50 )  Troponin T  0.06 ,  CPK  X    , CKMB  X    , BNP X                                  15.2   7.10  )-----------( 115      ( 18 Sep 2023 01:17 )             45.4     09-18    137  |  100  |  11.8  ----------------------------<  109<H>  3.8   |  24.0  |  0.88    Ca    9.1      18 Sep 2023 01:17    TPro  7.4  /  Alb  4.3  /  TBili  0.5  /  DBili  x   /  AST  37  /  ALT  57<H>  /  AlkPhos  84  09-17    PT/INR/PTT ( 18 Sep 2023 01:17 )                       :                       :      X            :       125.5                 .        .                   .              .           .       X           .                                       Lipid Profile: Date: 09-17 @ 18:55  Total cholesterol 165; Direct LDL: --; HDL: 45; Triglycerides:144        TELEMETRY: SR        DIAGNOSTIC TESTING:  [ ] Echocardiogram:   < from: TTE Echo Complete w/ Contrast w/ Doppler (09.17.23 @ 18:11) >  Summary:   1. Left ventricular ejection fraction, by visual estimation, is 50 to 55%.   2. Normal global left ventricular systolic function.   3. Normal right ventricular size and function.   4. There is no evidence of pericardial effusion.   5. Trace mitral valve regurgitation.   6. Endocardial visualization was enhanced with intravenous echo contrast.    MD Vince Electronically signed on 9/17/2023 at 7:05:22 PM    < end of copied text >    [ ]  Catheterization:  [ ] Stress Test:    OTHER:

## 2023-09-18 NOTE — PROGRESS NOTE ADULT - SUBJECTIVE AND OBJECTIVE BOX
Department of Cardiology                                                                  [facility]/[place] [address]                                                            Telephone: [phone]. Fax:[phone]                                                                                     Pre-CATH       Narrative:  56 yo male with no risk factors.  Had chest pressure for 3 days off and on relieved with rest.  Went to urgent care for chest pain found to have an abnormal EKG with inferior changes he was given asa and St abnormalities resolved . Came to the ER and was sen by dr Galeana 3 rd Troponin was + at 0.08 . His Echo with EF of 55% no valvular abnormalities, no significant wall motion abnormalities.        	  MEDICATIONS:  metoprolol tartrate 25 milliGRAM(s) Oral every 12 hours  acetaminophen     Tablet .. 650 milliGRAM(s) Oral every 6 hours PRN  melatonin 3 milliGRAM(s) Oral at bedtime PRN  ondansetron Injectable 4 milliGRAM(s) IV Push every 8 hours PRN  aluminum hydroxide/magnesium hydroxide/simethicone Suspension 30 milliLiter(s) Oral every 4 hours PRN  atorvastatin 80 milliGRAM(s) Oral at bedtime  aspirin  chewable 81 milliGRAM(s) Oral daily  heparin   Injectable 5000 Unit(s) IV Push every 6 hours PRN  heparin  Infusion.  Unit(s)/Hr IV Continuous <Continuous>  ticagrelor 90 milliGRAM(s) Oral every 12 hours        PHYSICAL EXAM:    T(C): 36.4 (09-18-23 @ 04:36), Max: 36.9 (09-17-23 @ 14:38)  HR: 79 (09-18-23 @ 07:55) (64 - 97)  BP: 124/79 (09-18-23 @ 07:55) (121/79 - 142/86)  RR: 18 (09-18-23 @ 07:55) (16 - 18)  SpO2: 99% (09-18-23 @ 07:55) (97% - 99%)      Constitutional: A & O x 3  HEENT:   Normal oral mucosa, PERRL, EOMI	  Cardiovascular: Normal S1 S2, No JVD, No murmurs, No edema  Respiratory: Lungs clear to auscultation	  Gastrointestinal:  Soft, Non-tender, + BS	  Skin: No rashes, No ecchymoses, No cyanosis  Neurologic: Non-focal  Extremities: Normal range of motion, No clubbing, cyanosis or edema  Vascular: Peripheral pulses palpable 2+ bilaterally    TELEMETRY:  Sinus 	    ECG:  	Inferior changes. on initial EKG resolved with asa and heparin .   LABS:	 	    CARDIAC MARKERS:                        15.2   7.10  )-----------( 115      ( 18 Sep 2023 01:17 )             45.4     09-18    137  |  100  |  11.8  ----------------------------<  109<H>  3.8   |  24.0  |  0.88    Ca    9.1      18 Sep 2023 01:17    TPro  7.4  /  Alb  4.3  /  TBili  0.5  /  DBili  x   /  AST  37  /  ALT  57<H>  /  AlkPhos  84  09-17        ASSESSMENT: 56 yo male with abnormal EKG and abnormal Tropinins. for [facility].     -as ordered  -Procedure discussed with patient; risks and benefits explained; questions answered  -Labs and ECG reviewed

## 2023-09-18 NOTE — PROGRESS NOTE ADULT - SUBJECTIVE AND OBJECTIVE BOX
Department of Cardiology                                                                  [facility]/[place] [address] [place] [address]                                                            Telephone: [phone]. Fax:[phone]                                                                             Cardiology POST CATH Progress Note         POST [facility] RESULTS  NO COMPLICATIONS DURING CATHETERIZATION  RECIEVED IN HR NAD, HDS.                                                                                                                                           Heart Failure:     Echo Date:     Prior Cardiac Interventions:         Stress Test: Date:     Risk Assessments:  ASA:  Mallampati:  Bleeding Risk:  Creatinine:  GFR:    Associated Risk Factors:        Frailty Assessment (none/mild/mod/severe)       Cerebrovascular Disease: N/A       Chronic Lung Disease: N/A       Peripheral Arterial Disease: N/A       Chronic Kidney Disease (if yes, what is GFR): N/A       Uncontrolled Diabetes (if yes, what is HgbA1C or FBS): N/A       Poorly Controlled Hypertension (if yes, what is SBP): N/A       Morbid Obesity (if yes, what is BMI): N/A       History of Recent Ventricular Arrhythmia: N/A       Inability to Ambulate Safely: N/A       Need for Therapeutic Anticoagulation: N/A       Antiplatelet or Contrast Allergy: N/A    Antianginal Therapies:        Beta Blockers:         Calcium Channel Blockers:        Long Acting Nitrates:        Ranexa:     MEDICATIONS:  metoprolol tartrate 25 milliGRAM(s) Oral every 12 hours        acetaminophen     Tablet .. 650 milliGRAM(s) Oral every 6 hours PRN  melatonin 3 milliGRAM(s) Oral at bedtime PRN  ondansetron Injectable 4 milliGRAM(s) IV Push every 8 hours PRN    aluminum hydroxide/magnesium hydroxide/simethicone Suspension 30 milliLiter(s) Oral every 4 hours PRN    atorvastatin 80 milliGRAM(s) Oral at bedtime    aspirin  chewable 81 milliGRAM(s) Oral daily  heparin   Injectable 5000 Unit(s) IV Push every 6 hours PRN  heparin  Infusion.  Unit(s)/Hr IV Continuous <Continuous>  sodium chloride 0.9% Bolus 250 milliLiter(s) IV Bolus once  sodium chloride 0.9% Bolus 250 milliLiter(s) IV Bolus once  ticagrelor 90 milliGRAM(s) Oral every 12 hours        ROS: as stated above, otherwise negative    PHYSICAL EXAM:  Constitutional: A & O x 3  HEENT:   Normal oral mucosa, PERRL, EOMI	  Cardiovascular: Normal S1 S2, No JVD, No murmurs  Respiratory: Lungs clear to auscultation	  Gastrointestinal:  Soft, Non-tender, + BS	  Skin: No rashes, No ecchymoses, No cyanosis  Neurologic: Non-focal  Extremities: Normal range of motion, no edema  Vascular access site  Peripheral pulses palpable + bilaterally    T(C): 36.6 (09-18-23 @ 07:55), Max: 36.9 (09-17-23 @ 14:38)  HR: 87 (09-18-23 @ 10:18) (64 - 97)  BP: 107/75 (09-18-23 @ 10:18) (107/75 - 142/86)  RR: 18 (09-18-23 @ 10:18) (16 - 18)  SpO2: 98% (09-18-23 @ 10:18) (97% - 99%)  Wt(kg): --  I&O's Summary    Daily Height in cm: 172.72 (17 Sep 2023 17:41)    Daily   TELEMETRY: 	    ECG:  	  LABS:	 	                                15.2   7.10  )-----------( 115      ( 18 Sep 2023 01:17 )             45.4     09-18    137  |  100  |  11.8  ----------------------------<  109<H>  3.8   |  24.0  |  0.88    Ca    9.1      18 Sep 2023 01:17    TPro  7.4  /  Alb  4.3  /  TBili  0.5  /  DBili  x   /  AST  37  /  ALT  57<H>  /  AlkPhos  84  09-17    Lipid Profile:   HgA1c:   proBNP:       A/P:      #Pre cath  -plan for LHC via RA vs FA  -patient seen and examined  -confirmed appropriate NPO duration  -ECG and Labs reviewed  -Aspirin 81mg po pre-cath  -NS 250mL IV bolus pre-cath   -procedure discussed with patient; risks and benefits explained, questions answered  -consent obtained by attending IC    #s/p LHC/** on ** with ** via **  -Cont DAPT  -Cont BB/ACE-I/Statin**  -Cr stable post contrast administration day **  -RF management to prevent ISR/progression of CAD**  -monitor ** access site and **pulse  per protocol  -bedrest for   -remove radial band/sheath   - cardiac rehab info provided / referral and communication to cardiac rehab competed     #CAD   -Cont DAPT with ASA 81mg and Plavix 75mg po daily  -Cont BB**mg po daily  -Cont high dose statin with ***mg po qHS  -ECG and Labs reviewed    #Chest Pain syndrome with ***  -Cont ASA 81mg po daily  -Hold Lovenox this morning for LHC  -Cont/start BB with ***  -Cont statin with ***mg po qHS  -ECG and Labs reviewed  -plan for LHC via RA vs FA  -patient seen and examined  -confirmed appropriate NPO duration  -procedure discussed with patient; risks and benefits explained, questions answered  -consent obtained by attending IC    #HTN  -Cont ***  -Low Na diet    #HLD  -Cont/start ***  -Low fat/cholesterol diet  -Routine Lipid panels to ensure at goal     #DM  -F/S ac and HS  -Cont correction scale  -Cont ***  -Diabetic/carb limited diet  -Routine HbA1C levels to assess glucose control    #CKD stage ***  -montior daily BUN/Cr and GFR  -Monitor I and O  -Avoid nephrotoxic agents where possible    #ADHF/CM with EF ***  -Cont BB ***mg po daily  -Cont diuretic **mg po daily  -*** Entresto/***ACE-I/ARB   -daily routine labs  -supplement electrolytes as indicated  -Strict I + O  -daily weight  -check O2 sats per protocol     #A Fib with RVR now intermittent and rate controlled  -Cont rate control with *** po daily  -Cont A/C with ***  -monitor PTT/INR ***  -Continous tele monitoring  -Repeat ECG if change in rhythm  -EP consult if indicated    #Valvular HD   -Cont diuretic***mg po daily  -Repeat TTE to re-eval LVEF and severity of valvular disease when appropriate post PCI and optimization    #Acute hypoxic respiratory failure with leukocytosis in the setting of chronic COPD, PF and newly dx LV systolic dysfunction   -monitor pulm status and cont O2 sat  -monitor WBCs and temps  -Cont O2 N/C  -Cont IV steroids  -Cont bronchodilators  -Continue A/B course  -Pulm consulting    #PPX  -Continue Lovenox for DVT ppx    #Full Code    #Dispo                                                                                 Department of Cardiology                                                                  [facility]/[place] [address] [address]                                                            Telephone: [phone]. Fax:[phone]                                                                             Cardiology POST CATH Progress Note         POST [facility] RESULTS TANIYA to the RPDL 3.0 X 23   LAD / Ostial Diag to be adressed at a later Date .   RRA site benign digits brisk pink     NO COMPLICATIONS DURING CATHETERIZATION  RECIEVED IN HR NAD, HDS.                                                                                                                                             MEDICATIONS:  metoprolol tartrate 25 milliGRAM(s) Oral every 12 hours  acetaminophen     Tablet .. 650 milliGRAM(s) Oral every 6 hours PRN  melatonin 3 milliGRAM(s) Oral at bedtime PRN  ondansetron Injectable 4 milliGRAM(s) IV Push every 8 hours PRN  aluminum hydroxide/magnesium hydroxide/simethicone Suspension 30 milliLiter(s) Oral every 4 hours PRN  atorvastatin 80 milliGRAM(s) Oral at bedtime  aspirin  chewable 81 milliGRAM(s) Oral daily  heparin   Injectable 5000 Unit(s) IV Push every 6 hours PRN  heparin  Infusion.  Unit(s)/Hr IV Continuous <Continuous>  sodium chloride 0.9% Bolus 250 milliLiter(s) IV Bolus once  sodium chloride 0.9% Bolus 250 milliLiter(s) IV Bolus once  ticagrelor 90 milliGRAM(s) Oral every 12 hours      ROS: as stated above, otherwise negative    PHYSICAL EXAM:  Constitutional: A & O x 3  HEENT:   Normal oral mucosa, PERRL, EOMI	  Cardiovascular: Normal S1 S2, No JVD, No murmurs  Respiratory: Lungs clear to auscultation	  Gastrointestinal:  Soft, Non-tender, + BS	  Skin: No rashes, No ecchymoses, No cyanosis  Neurologic: Non-focal  Extremities: Normal range of motion, no edema  Vascular access site  Peripheral pulses palpable + bilaterally    T(C): 36.6 (09-18-23 @ 07:55), Max: 36.9 (09-17-23 @ 14:38)  HR: 87 (09-18-23 @ 10:18) (64 - 97)  BP: 107/75 (09-18-23 @ 10:18) (107/75 - 142/86)  RR: 18 (09-18-23 @ 10:18) (16 - 18)  SpO2: 98% (09-18-23 @ 10:18) (97% - 99%)     	                          15.2   7.10  )-----------( 115      ( 18 Sep 2023 01:17 )             45.4     09-18    137  |  100  |  11.8  ----------------------------<  109<H>  3.8   |  24.0  |  0.88    Ca    9.1      18 Sep 2023 01:17    TPro  7.4  /  Alb  4.3  /  TBili  0.5  /  DBili  x   /  AST  37  /  ALT  57<H>  /  AlkPhos  84  09-17        A/P:  56 y/o male with good general health who presents to the ER with complaints of chest pressure for 2 days.  Pressure can occur with activity and rest.  Last episode was this am at urgent care and it lasted approx 10 minutes and her was referred to the hospital for evaluation.  First  2 Troponin negative, 3rd trop 0.08 with out chest pain.  But initial EKG in Urgent Care abnormal with inferior st changes.       #s/p LHC Post TANIYA to the RPDL with Xience 3.0 X 23  -Cont DAPT Brilinta and aspirin  -Cont Metoprolol and atorvastatin   - Start low dose ace lisinopril 2.5mg po QD   -Plan for return for LAD.Ostial Diag  -RF management to prevent ISR/progression of CAD   -monitor RRA access site and pulse  per protocol  -bedrest for 1 hr after band removed   - cardiac rehab info provided / referral and communication to cardiac rehab competed   -ECG Post Cath reviewed   - am labs ordered   - Wednesday plan for Staged cath with Dr Butler    -If chest pain then repeat EKG   - RX FOR BRILINTA SENT TO [facility].     #PPX  -Continue Lovenox for DVT ppx    #Full Code    #Dispo

## 2023-09-18 NOTE — PROGRESS NOTE ADULT - ASSESSMENT
58 y/o male with good general health who presents to the ER with complaints of chest pressure for 2 days.  Pressure can occur with activity and rest.  Last episode was this am at urgent care and it lasted approx 10 minutes and her was referred to the hospital for evaluation.  He is chest pain free at this time.  Trop x 1 0.06  Ekg no ischemic changes  Ekg at urgent care inf infarct with st elevation down on arrival to ER without any chest discomfort

## 2023-09-19 DIAGNOSIS — I25.10 ATHEROSCLEROTIC HEART DISEASE OF NATIVE CORONARY ARTERY WITHOUT ANGINA PECTORIS: ICD-10-CM

## 2023-09-19 LAB
ALBUMIN SERPL ELPH-MCNC: 4 G/DL — SIGNIFICANT CHANGE UP (ref 3.3–5.2)
ALP SERPL-CCNC: 69 U/L — SIGNIFICANT CHANGE UP (ref 40–120)
ALT FLD-CCNC: 63 U/L — HIGH
ANION GAP SERPL CALC-SCNC: 14 MMOL/L — SIGNIFICANT CHANGE UP (ref 5–17)
AST SERPL-CCNC: 39 U/L — SIGNIFICANT CHANGE UP
BASOPHILS # BLD AUTO: 0.03 K/UL — SIGNIFICANT CHANGE UP (ref 0–0.2)
BASOPHILS NFR BLD AUTO: 0.4 % — SIGNIFICANT CHANGE UP (ref 0–2)
BILIRUB SERPL-MCNC: 1.1 MG/DL — SIGNIFICANT CHANGE UP (ref 0.4–2)
BUN SERPL-MCNC: 12.6 MG/DL — SIGNIFICANT CHANGE UP (ref 8–20)
CALCIUM SERPL-MCNC: 9.2 MG/DL — SIGNIFICANT CHANGE UP (ref 8.4–10.5)
CHLORIDE SERPL-SCNC: 99 MMOL/L — SIGNIFICANT CHANGE UP (ref 96–108)
CO2 SERPL-SCNC: 23 MMOL/L — SIGNIFICANT CHANGE UP (ref 22–29)
CREAT SERPL-MCNC: 0.9 MG/DL — SIGNIFICANT CHANGE UP (ref 0.5–1.3)
EGFR: 100 ML/MIN/1.73M2 — SIGNIFICANT CHANGE UP
EOSINOPHIL # BLD AUTO: 0.18 K/UL — SIGNIFICANT CHANGE UP (ref 0–0.5)
EOSINOPHIL NFR BLD AUTO: 2.3 % — SIGNIFICANT CHANGE UP (ref 0–6)
GLUCOSE SERPL-MCNC: 99 MG/DL — SIGNIFICANT CHANGE UP (ref 70–99)
HCT VFR BLD CALC: 46.3 % — SIGNIFICANT CHANGE UP (ref 39–50)
HGB BLD-MCNC: 15.6 G/DL — SIGNIFICANT CHANGE UP (ref 13–17)
IMM GRANULOCYTES NFR BLD AUTO: 0.4 % — SIGNIFICANT CHANGE UP (ref 0–0.9)
LYMPHOCYTES # BLD AUTO: 1.38 K/UL — SIGNIFICANT CHANGE UP (ref 1–3.3)
LYMPHOCYTES # BLD AUTO: 17.4 % — SIGNIFICANT CHANGE UP (ref 13–44)
MCHC RBC-ENTMCNC: 30.9 PG — SIGNIFICANT CHANGE UP (ref 27–34)
MCHC RBC-ENTMCNC: 33.7 GM/DL — SIGNIFICANT CHANGE UP (ref 32–36)
MCV RBC AUTO: 91.7 FL — SIGNIFICANT CHANGE UP (ref 80–100)
MONOCYTES # BLD AUTO: 0.51 K/UL — SIGNIFICANT CHANGE UP (ref 0–0.9)
MONOCYTES NFR BLD AUTO: 6.4 % — SIGNIFICANT CHANGE UP (ref 2–14)
NEUTROPHILS # BLD AUTO: 5.78 K/UL — SIGNIFICANT CHANGE UP (ref 1.8–7.4)
NEUTROPHILS NFR BLD AUTO: 73.1 % — SIGNIFICANT CHANGE UP (ref 43–77)
PLATELET # BLD AUTO: 124 K/UL — LOW (ref 150–400)
POTASSIUM SERPL-MCNC: 4.3 MMOL/L — SIGNIFICANT CHANGE UP (ref 3.5–5.3)
POTASSIUM SERPL-SCNC: 4.3 MMOL/L — SIGNIFICANT CHANGE UP (ref 3.5–5.3)
PROT SERPL-MCNC: 6.9 G/DL — SIGNIFICANT CHANGE UP (ref 6.6–8.7)
RBC # BLD: 5.05 M/UL — SIGNIFICANT CHANGE UP (ref 4.2–5.8)
RBC # FLD: 12.3 % — SIGNIFICANT CHANGE UP (ref 10.3–14.5)
SODIUM SERPL-SCNC: 136 MMOL/L — SIGNIFICANT CHANGE UP (ref 135–145)
WBC # BLD: 7.91 K/UL — SIGNIFICANT CHANGE UP (ref 3.8–10.5)
WBC # FLD AUTO: 7.91 K/UL — SIGNIFICANT CHANGE UP (ref 3.8–10.5)

## 2023-09-19 PROCEDURE — 93010 ELECTROCARDIOGRAM REPORT: CPT

## 2023-09-19 PROCEDURE — 99233 SBSQ HOSP IP/OBS HIGH 50: CPT

## 2023-09-19 PROCEDURE — 99232 SBSQ HOSP IP/OBS MODERATE 35: CPT

## 2023-09-19 RX ORDER — CLOPIDOGREL BISULFATE 75 MG/1
600 TABLET, FILM COATED ORAL ONCE
Refills: 0 | Status: DISCONTINUED | OUTPATIENT
Start: 2023-09-19 | End: 2023-09-19

## 2023-09-19 RX ORDER — CLOPIDOGREL BISULFATE 75 MG/1
600 TABLET, FILM COATED ORAL ONCE
Refills: 0 | Status: COMPLETED | OUTPATIENT
Start: 2023-09-19 | End: 2023-09-19

## 2023-09-19 RX ORDER — CLOPIDOGREL BISULFATE 75 MG/1
75 TABLET, FILM COATED ORAL DAILY
Refills: 0 | Status: DISCONTINUED | OUTPATIENT
Start: 2023-09-20 | End: 2023-09-21

## 2023-09-19 RX ADMIN — TICAGRELOR 90 MILLIGRAM(S): 90 TABLET ORAL at 06:09

## 2023-09-19 RX ADMIN — HEPARIN SODIUM 5000 UNIT(S): 5000 INJECTION INTRAVENOUS; SUBCUTANEOUS at 06:10

## 2023-09-19 RX ADMIN — TICAGRELOR 90 MILLIGRAM(S): 90 TABLET ORAL at 17:15

## 2023-09-19 RX ADMIN — Medication 25 MILLIGRAM(S): at 06:10

## 2023-09-19 RX ADMIN — ATORVASTATIN CALCIUM 80 MILLIGRAM(S): 80 TABLET, FILM COATED ORAL at 22:45

## 2023-09-19 RX ADMIN — CLOPIDOGREL BISULFATE 600 MILLIGRAM(S): 75 TABLET, FILM COATED ORAL at 22:46

## 2023-09-19 RX ADMIN — Medication 81 MILLIGRAM(S): at 10:46

## 2023-09-19 RX ADMIN — HEPARIN SODIUM 5000 UNIT(S): 5000 INJECTION INTRAVENOUS; SUBCUTANEOUS at 17:15

## 2023-09-19 RX ADMIN — LISINOPRIL 2.5 MILLIGRAM(S): 2.5 TABLET ORAL at 06:09

## 2023-09-19 NOTE — CHART NOTE - NSCHARTNOTEFT_GEN_A_CORE
Spoke to patients pharmacy department of [employer]   pre authorization denied for Irene   Spoke to [employer] and not covered bnifit.

## 2023-09-19 NOTE — PROGRESS NOTE ADULT - ASSESSMENT
Refill sent in.  Thanks.   A/P: 56 y/o male with good general health who presents to the ER with complaints of chest pressure for 2 days.  Pressure can occur with activity and rest.  Last episode was this am at urgent care and it lasted approx 10 minutes and her was referred to the hospital for evaluation.  He is chest pain free at this time.  Trop x 1 0.06  Ekg no ischemic changes  Ekg at urgent care inf infarct with st elevation down on arrival to ER without any chest discomfort

## 2023-09-19 NOTE — PROGRESS NOTE ADULT - ASSESSMENT
58 y/o M w/ no significant PMH was sent to the from urgent care for evaluation of chest pain.  Troponins found to be 0.06 > 0.06 > 0.08 but EKG without ischemic changes.  Pt admitted for unstable angina.  Pt s/p [facility] 09/18 and had PCI to RPDL w/ plan for staged pci to LAD ostial diagonal tomorrow.        Unstable angina   - Trops  0.06 > 0.06 > 0.08 on admission and EKG w/out ischemic changes  - TTE w/ LVEF 50-55% and no WMA  - s/p [facility] (9/18) w/ PCI to RPDL  - c/w DAPT in the form of ASA and brillinta  - c/w BB, ACEI and statin  - Will go for staged PCI tomorrow; npo after midnight  - c/w tele monitoring  - Cardio following and recs noted      Pre-DM  - pt advised on lifestyle modifications following discharge      VTE ppx:  heparin    Dispo: pt remains acute.

## 2023-09-19 NOTE — PROGRESS NOTE ADULT - SUBJECTIVE AND OBJECTIVE BOX
Chief Complaint:      SUBJECTIVE / OVERNIGHT EVENTS:    Patient denies chest pain, SOB, abd pain, N/V, fever, chills, dysuria or any other complaints. All remainder ROS negative.       I&O's Summary        PHYSICAL EXAM:  Vital Signs Last 24 Hrs  T(C): 36.9 (19 Sep 2023 12:22), Max: 36.9 (19 Sep 2023 12:22)  T(F): 98.4 (19 Sep 2023 12:22), Max: 98.4 (19 Sep 2023 12:22)  HR: 70 (19 Sep 2023 12:22) (69 - 83)  BP: 106/68 (19 Sep 2023 12:22) (106/68 - 124/77)  BP(mean): --  RR: 19 (19 Sep 2023 12:22) (18 - 19)  SpO2: 95% (19 Sep 2023 12:22) (94% - 97%)    Parameters below as of 19 Sep 2023 04:26  Patient On (Oxygen Delivery Method): room air        GENERAL: pt examined bedside, laying comfortably in bed in NAD  HEENT: NC/AT, moist oral mucosa, clear conjunctiva, sclera nonicteric  RESPIRATORY: no wheezing, rhonchi, rales  CARDIOVASCULAR: RRR, normal S1 and S2  ABDOMEN: soft, NT/ND, normoactive bowel sounds, no rebound/guarding  EXTREMITIES: No cynaosis, no clubbing, no lower extremity edema  NEUROLOGY: A+O to person, place, and time, no focal neurologic deficits appreciated   SKIN: No rashes or no palpable lesions        LABS:                        15.6   7.91  )-----------( 124      ( 19 Sep 2023 05:16 )             46.3     09-19    136  |  99  |  12.6  ----------------------------<  99  4.3   |  23.0  |  0.90    Ca    9.2      19 Sep 2023 05:16    TPro  6.9  /  Alb  4.0  /  TBili  1.1  /  DBili  x   /  AST  39  /  ALT  63<H>  /  AlkPhos  69  09-19    PTT - ( 18 Sep 2023 01:17 )  PTT:125.5 sec  CARDIAC MARKERS ( 17 Sep 2023 19:46 )  x     / 0.08 ng/mL / x     / x     / x      CARDIAC MARKERS ( 17 Sep 2023 18:55 )  x     / 0.06 ng/mL / 214 U/L / x     / 8.6 ng/mL      Urinalysis Basic - ( 19 Sep 2023 05:16 )    Color: x / Appearance: x / SG: x / pH: x  Gluc: 99 mg/dL / Ketone: x  / Bili: x / Urobili: x   Blood: x / Protein: x / Nitrite: x   Leuk Esterase: x / RBC: x / WBC x   Sq Epi: x / Non Sq Epi: x / Bacteria: x        RADIOLOGY & ADDITIONAL TESTS:    < from: Xray Chest 1 View- PORTABLE-Urgent (09.17.23 @ 16:12) >  IMPRESSION: No acute cardiopulmonary disease process.    < end of copied text >      < from: TTE Echo Complete w/ Contrast w/ Doppler (09.17.23 @ 18:11) >  Summary:   1. Left ventricular ejection fraction, by visual estimation, is 50 to   55%.   2. Normal global left ventricular systolic function.   3. Normal right ventricular size and function.   4. There is no evidence of pericardial effusion.   5. Trace mitral valve regurgitation.   6. Endocardial visualization was enhanced with intravenous echo contrast.    < end of copied text >        MEDICATIONS  (STANDING):  aspirin  chewable 81 milliGRAM(s) Oral daily  atorvastatin 80 milliGRAM(s) Oral at bedtime  heparin   Injectable 5000 Unit(s) SubCutaneous every 12 hours  lisinopril 2.5 milliGRAM(s) Oral daily  metoprolol tartrate 25 milliGRAM(s) Oral every 12 hours  ticagrelor 90 milliGRAM(s) Oral every 12 hours    MEDICATIONS  (PRN):  acetaminophen     Tablet .. 650 milliGRAM(s) Oral every 6 hours PRN Temp greater or equal to 38C (100.4F), Mild Pain (1 - 3)  aluminum hydroxide/magnesium hydroxide/simethicone Suspension 30 milliLiter(s) Oral every 4 hours PRN Dyspepsia  melatonin 3 milliGRAM(s) Oral at bedtime PRN Insomnia  ondansetron Injectable 4 milliGRAM(s) IV Push every 8 hours PRN Nausea and/or Vomiting

## 2023-09-19 NOTE — PROGRESS NOTE ADULT - SUBJECTIVE AND OBJECTIVE BOX
[facility]                                                         CARDIOLOGY AT [address], [place] [address]                                                         Telephone: [phone]. Fax:[phone]                                                                             PROGRESS NOTE    Reason for follow up: CAD  Update: Patient underwent TANIYA x 1 to RPDL 09/18/23, with plans for staged PCI of the LAD on 09/20/23. Patient with no active complaints.       Review of symptoms:   Cardiac:  No chest pain. No dyspnea. No palpitations.  Respiratory: no cough. No dyspnea  Gastrointestinal: No diarrhea. No abdominal pain. No bleeding.   Neuro: No focal neuro complaints.    Vitals:  T(C): 36.7 (09-19-23 @ 04:26), Max: 36.7 (09-19-23 @ 04:26)  HR: 83 (09-19-23 @ 04:26) (65 - 87)  BP: 124/69 (09-19-23 @ 04:26) (106/68 - 126/72)  RR: 18 (09-19-23 @ 04:26) (14 - 18)  SpO2: 96% (09-19-23 @ 04:26) (96% - 99%)  Wt(kg): --  I&O's Summary    Weight (kg): 85.3 (09-18 @ 07:55)    PHYSICAL EXAM:  Appearance: Comfortable. No acute distress  HEENT:  Atraumatic. Normocephalic.  Normal oral mucosa  Neurologic: A & O x 3, no gross focal deficits.  Cardiovascular: RRR S1 S2, No murmur, no rubs/gallops. No JVD  Respiratory: Lungs clear to auscultation, unlabored   Gastrointestinal:  Soft, Non-tender, + BS  Lower Extremities: 2+ Peripheral Pulses, No clubbing, cyanosis, or edema, right wrist benign s/p cath.  No bleeding, no ecchymosis, no hematoma. Extremity Warm to touch, with palpable distal pulses, and brisk capillary refill.   Psychiatry: Patient is calm. No agitation.   Skin: warm and dry.    CURRENT CARDIAC MEDICATIONS:  lisinopril 2.5 milliGRAM(s) Oral daily  metoprolol tartrate 25 milliGRAM(s) Oral every 12 hours      CURRENT OTHER MEDICATIONS:  acetaminophen     Tablet .. 650 milliGRAM(s) Oral every 6 hours PRN Temp greater or equal to 38C (100.4F), Mild Pain (1 - 3)  melatonin 3 milliGRAM(s) Oral at bedtime PRN Insomnia  ondansetron Injectable 4 milliGRAM(s) IV Push every 8 hours PRN Nausea and/or Vomiting  aluminum hydroxide/magnesium hydroxide/simethicone Suspension 30 milliLiter(s) Oral every 4 hours PRN Dyspepsia  atorvastatin 80 milliGRAM(s) Oral at bedtime  aspirin  chewable 81 milliGRAM(s) Oral daily  heparin   Injectable 5000 Unit(s) SubCutaneous every 12 hours  ticagrelor 90 milliGRAM(s) Oral every 12 hours      LABS:	 	  ( 17 Sep 2023 19:46 )  Troponin T  0.08<H>,  CPK  X    , CKMB  X    , BNP X        , ( 17 Sep 2023 18:55 )  Troponin T  0.06 ,  CPK  214<H>, CKMB  X    , BNP X        , ( 17 Sep 2023 15:50 )  Troponin T  0.06 ,  CPK  X    , CKMB  X    , BNP X                                  15.6   7.91  )-----------( 124      ( 19 Sep 2023 05:16 )             46.3     09-19    136  |  99  |  12.6  ----------------------------<  99  4.3   |  23.0  |  0.90    Ca    9.2      19 Sep 2023 05:16    TPro  6.9  /  Alb  4.0  /  TBili  1.1  /  DBili  x   /  AST  39  /  ALT  63<H>  /  AlkPhos  69  09-19    PT/INR/PTT ( 18 Sep 2023 01:17 )                       :                       :      X            :       125.5                 .        .                   .              .           .       X           .                                       Lipid Profile: Date: 09-17 @ 18:55  Total cholesterol 165; Direct LDL: --; HDL: 45; Triglycerides:144    HgA1c:   TSH:     TELEMETRY:   ECG:    DIAGNOSTIC TESTING:  [ ] Echocardiogram:   < from: TTE Echo Complete w/ Contrast w/ Doppler (09.17.23 @ 18:11) >  PHYSICIAN INTERPRETATION:  Left Ventricle: Endocardial visualization was enhanced with intravenous   echo contrast. The left ventricular internal cavity size is normal.  Global LV systolic function was normal. Left ventricular ejection   fraction, by visual estimation, is 50 to 55%.  Right Ventricle: Normal right ventricular size and function.  Left Atrium: The left atrium is normal in size.  Right Atrium: The right atrium is normal in size.  Pericardium: There is no evidence of pericardial effusion.  Mitral Valve: Mitral leaflet mobility is normal. Trace mitral valve   regurgitation is seen. Mild buckling of the anterior marbella valve leaflet.  Tricuspid Valve: Trivial tricuspid regurgitation is visualized.  Aortic Valve: The aortic valve is trileaflet. No evidence of aortic valve   regurgitation is seen.  Pulmonic Valve: Mild pulmonic valve regurgitation.  Aorta: The aortic root is normal in size and structure.  Pulmonary Artery: The main pulmonary artery is normal in size.  Venous: The inferior vena cava was normal sized, with respiratory size   variation greater than 50%.      Summary:   1. Left ventricular ejection fraction, by visual estimation, is 50 to   55%.   2. Normal global left ventricular systolic function.   3. Normal right ventricular size and function.   4. There is no evidence of pericardial effusion.   5. Trace mitral valve regurgitation.   6. Endocardial visualization was enhanced with intravenous echo contrast.    MD Vince Electronically signed on 9/17/2023 at 7:05:22 PM    < end of copied text >    [ ]  Catheterization:    [ ] Stress Test:    OTHER:

## 2023-09-20 LAB
ANION GAP SERPL CALC-SCNC: 14 MMOL/L — SIGNIFICANT CHANGE UP (ref 5–17)
BUN SERPL-MCNC: 21 MG/DL — HIGH (ref 8–20)
CALCIUM SERPL-MCNC: 9.2 MG/DL — SIGNIFICANT CHANGE UP (ref 8.4–10.5)
CHLORIDE SERPL-SCNC: 100 MMOL/L — SIGNIFICANT CHANGE UP (ref 96–108)
CO2 SERPL-SCNC: 23 MMOL/L — SIGNIFICANT CHANGE UP (ref 22–29)
CREAT SERPL-MCNC: 0.97 MG/DL — SIGNIFICANT CHANGE UP (ref 0.5–1.3)
EGFR: 92 ML/MIN/1.73M2 — SIGNIFICANT CHANGE UP
GLUCOSE SERPL-MCNC: 90 MG/DL — SIGNIFICANT CHANGE UP (ref 70–99)
HCT VFR BLD CALC: 46.9 % — SIGNIFICANT CHANGE UP (ref 39–50)
HGB BLD-MCNC: 16 G/DL — SIGNIFICANT CHANGE UP (ref 13–17)
MAGNESIUM SERPL-MCNC: 2 MG/DL — SIGNIFICANT CHANGE UP (ref 1.8–2.6)
MCHC RBC-ENTMCNC: 31.5 PG — SIGNIFICANT CHANGE UP (ref 27–34)
MCHC RBC-ENTMCNC: 34.1 GM/DL — SIGNIFICANT CHANGE UP (ref 32–36)
MCV RBC AUTO: 92.3 FL — SIGNIFICANT CHANGE UP (ref 80–100)
PHOSPHATE SERPL-MCNC: 4.5 MG/DL — SIGNIFICANT CHANGE UP (ref 2.4–4.7)
PLATELET # BLD AUTO: 133 K/UL — LOW (ref 150–400)
POTASSIUM SERPL-MCNC: 3.7 MMOL/L — SIGNIFICANT CHANGE UP (ref 3.5–5.3)
POTASSIUM SERPL-SCNC: 3.7 MMOL/L — SIGNIFICANT CHANGE UP (ref 3.5–5.3)
RBC # BLD: 5.08 M/UL — SIGNIFICANT CHANGE UP (ref 4.2–5.8)
RBC # FLD: 12 % — SIGNIFICANT CHANGE UP (ref 10.3–14.5)
SODIUM SERPL-SCNC: 137 MMOL/L — SIGNIFICANT CHANGE UP (ref 135–145)
WBC # BLD: 7.62 K/UL — SIGNIFICANT CHANGE UP (ref 3.8–10.5)
WBC # FLD AUTO: 7.62 K/UL — SIGNIFICANT CHANGE UP (ref 3.8–10.5)

## 2023-09-20 PROCEDURE — 92928 PRQ TCAT PLMT NTRAC ST 1 LES: CPT | Mod: LD

## 2023-09-20 PROCEDURE — 99152 MOD SED SAME PHYS/QHP 5/>YRS: CPT

## 2023-09-20 PROCEDURE — 93010 ELECTROCARDIOGRAM REPORT: CPT

## 2023-09-20 PROCEDURE — 92978 ENDOLUMINL IVUS OCT C 1ST: CPT | Mod: 26,LD

## 2023-09-20 PROCEDURE — 99233 SBSQ HOSP IP/OBS HIGH 50: CPT

## 2023-09-20 RX ORDER — SODIUM CHLORIDE 9 MG/ML
250 INJECTION INTRAMUSCULAR; INTRAVENOUS; SUBCUTANEOUS ONCE
Refills: 0 | Status: DISCONTINUED | OUTPATIENT
Start: 2023-09-20 | End: 2023-09-21

## 2023-09-20 RX ADMIN — HEPARIN SODIUM 5000 UNIT(S): 5000 INJECTION INTRAVENOUS; SUBCUTANEOUS at 21:37

## 2023-09-20 RX ADMIN — Medication 25 MILLIGRAM(S): at 05:25

## 2023-09-20 RX ADMIN — Medication 25 MILLIGRAM(S): at 18:43

## 2023-09-20 RX ADMIN — CLOPIDOGREL BISULFATE 75 MILLIGRAM(S): 75 TABLET, FILM COATED ORAL at 13:15

## 2023-09-20 RX ADMIN — ATORVASTATIN CALCIUM 80 MILLIGRAM(S): 80 TABLET, FILM COATED ORAL at 21:36

## 2023-09-20 RX ADMIN — LISINOPRIL 2.5 MILLIGRAM(S): 2.5 TABLET ORAL at 05:25

## 2023-09-20 RX ADMIN — Medication 81 MILLIGRAM(S): at 13:14

## 2023-09-20 NOTE — PROGRESS NOTE ADULT - ASSESSMENT
Risk Assessments:  ASA: 3  Mallampati: 2  Creat: 0.97  GFR: 92  BRA: 0.6%      Indication: Staged PCI of LAD     Coronary Anatomy:     Plan:    -plan for LHC via RRA   -confirmed appropriate NPO duration  -ECG and Labs reviewed  -Aspirin 81mg po pre-cath/Plavix 75mg   -NS 0.9% 250ml/hr x 1 bolus; pre procedure AARON ppx   -procedure discussed with patient; risks and benefits explained, questions answered  -consent obtained by attending IC      Left heart cardiac catheterization and possible percutaneous intervention recommended.  Risks, benefits, and alternatives reviewed.  Risks including but not limited to MI, death, stroke, bleeding, infection, vessel injury, hematoma, renal failure, allergic reaction, urgent open heart surgery, restenosis and stent thrombosis were reviewed.  All questions answered.  Patient is agreeable to proceed.    Risk Assessments:  ASA: 3  Mallampati: 2  Creat: 0.97  GFR: 92  BRA: 0.6%      Indication: Staged PCI of LAD     Coronary Anatomy:     Plan:    -plan for LHC via RRA   -confirmed appropriate NPO duration  -ECG and Labs reviewed  -Aspirin 81mg po pre-cath/Plavix 75mg   -NS 0.9% 250ml/hr x 1 bolus; pre procedure AARON ppx   -procedure discussed with patient; risks and benefits explained, questions answered  -consent obtained by attending IC      Left heart cardiac catheterization and possible percutaneous intervention recommended.  Risks, benefits, and alternatives reviewed.  Risks including but not limited to MI, death, stroke, bleeding, infection, vessel injury, hematoma, renal failure, allergic reaction, urgent open heart surgery, restenosis and stent thrombosis were reviewed.  All questions answered.  Patient is agreeable to proceed.

## 2023-09-20 NOTE — PROGRESS NOTE ADULT - ASSESSMENT
58 y/o M w/ no significant PMH was sent to the from urgent care for evaluation of chest pain.  Troponins found to be 0.06 > 0.06 > 0.08 but EKG without ischemic changes.  Pt admitted for unstable angina.  Pt s/p [facility] 09/18 and had PCI to RPDL w/ plan for staged pci to LAD today.        Unstable angina   - Trops  0.06 > 0.06 > 0.08 on admission and EKG w/out ischemic changes  - TTE w/ LVEF 50-55% and no WMA  - s/p [facility] (9/18) w/ PCI to RPDL  - c/w DAPT in the form of ASA and brillinta  - c/w BB, ACEI and statin  - Will go for staged PCI this afternoon  - c/w tele monitoring  - Cardio following and recs noted      Pre-DM  - pt advised on lifestyle modifications following discharge      VTE ppx:  heparin    Dispo: pt remains acute.

## 2023-09-20 NOTE — PROGRESS NOTE ADULT - REASON FOR ADMISSION
CP abnormal EKG, Troponin +
unstable angina
post [facility]   NSTEMI
C
unstable angina
unstable angina

## 2023-09-20 NOTE — PROGRESS NOTE ADULT - SUBJECTIVE AND OBJECTIVE BOX
Chief Complaint:  chest pain     SUBJECTIVE / OVERNIGHT EVENTS:  no acute events reported overnight.  Pt offers no acute complaints at this time.         I&O's Summary        PHYSICAL EXAM:  Vital Signs Last 24 Hrs  T(C): 36.9 (20 Sep 2023 13:18), Max: 36.9 (20 Sep 2023 04:50)  T(F): 98.4 (20 Sep 2023 13:18), Max: 98.4 (20 Sep 2023 04:50)  HR: 83 (20 Sep 2023 13:18) (69 - 91)  BP: 105/70 (20 Sep 2023 13:18) (99/65 - 120/78)  BP(mean): --  RR: 19 (20 Sep 2023 13:18) (18 - 19)  SpO2: 99% (20 Sep 2023 13:18) (93% - 99%)    Parameters below as of 20 Sep 2023 13:18  Patient On (Oxygen Delivery Method): room air        GENERAL: pt examined bedside, laying comfortably in bed in NAD  HEENT: NC/AT, moist oral mucosa, clear conjunctiva, sclera nonicteric  RESPIRATORY: no wheezing, rhonchi, rales  CARDIOVASCULAR: RRR, normal S1 and S2  ABDOMEN: soft, NT/ND, normoactive bowel sounds, no rebound/guarding  EXTREMITIES: No cynaosis, no clubbing, no lower extremity edema  NEUROLOGY: A+O to person, place, and time, no focal neurologic deficits appreciated   SKIN: No rashes or no palpable lesions        LABS:                                         16.0   7.62  )-----------( 133      ( 20 Sep 2023 06:08 )             46.9         09-20    137  |  100  |  21.0<H>  ----------------------------<  90  3.7   |  23.0  |  0.97    Ca    9.2      20 Sep 2023 06:08  Phos  4.5     09-20  Mg     2.0     09-20    TPro  6.9  /  Alb  4.0  /  TBili  1.1  /  DBili  x   /  AST  39  /  ALT  63<H>  /  AlkPhos  69  09-19      Urinalysis Basic - ( 19 Sep 2023 05:16 )    Color: x / Appearance: x / SG: x / pH: x  Gluc: 99 mg/dL / Ketone: x  / Bili: x / Urobili: x   Blood: x / Protein: x / Nitrite: x   Leuk Esterase: x / RBC: x / WBC x   Sq Epi: x / Non Sq Epi: x / Bacteria: x        RADIOLOGY & ADDITIONAL TESTS:    < from: Xray Chest 1 View- PORTABLE-Urgent (09.17.23 @ 16:12) >  IMPRESSION: No acute cardiopulmonary disease process.    < end of copied text >      < from: TTE Echo Complete w/ Contrast w/ Doppler (09.17.23 @ 18:11) >  Summary:   1. Left ventricular ejection fraction, by visual estimation, is 50 to   55%.   2. Normal global left ventricular systolic function.   3. Normal right ventricular size and function.   4. There is no evidence of pericardial effusion.   5. Trace mitral valve regurgitation.   6. Endocardial visualization was enhanced with intravenous echo contrast.    < end of copied text >        MEDICATIONS  (STANDING):  aspirin  chewable 81 milliGRAM(s) Oral daily  atorvastatin 80 milliGRAM(s) Oral at bedtime  heparin   Injectable 5000 Unit(s) SubCutaneous every 12 hours  lisinopril 2.5 milliGRAM(s) Oral daily  metoprolol tartrate 25 milliGRAM(s) Oral every 12 hours  ticagrelor 90 milliGRAM(s) Oral every 12 hours    MEDICATIONS  (PRN):  acetaminophen     Tablet .. 650 milliGRAM(s) Oral every 6 hours PRN Temp greater or equal to 38C (100.4F), Mild Pain (1 - 3)  aluminum hydroxide/magnesium hydroxide/simethicone Suspension 30 milliLiter(s) Oral every 4 hours PRN Dyspepsia  melatonin 3 milliGRAM(s) Oral at bedtime PRN Insomnia  ondansetron Injectable 4 milliGRAM(s) IV Push every 8 hours PRN Nausea and/or Vomiting

## 2023-09-20 NOTE — CHART NOTE - NSCHARTNOTEFT_GEN_A_CORE
Now s/p C and staged PCI of LAD via RRA with Dr. Butler, tolerated procedure well. Pt arrived to recovery in NAD and HDS, *** access site stable, no bleed/hematoma, distal pulse +,   Intraprocedurally: Patient rec'd IV sedation, heaprin 8,000 units, and omnipaque 66ml  Findings: 80% lesion prox/mid LAD treated with 2 TANIYA: prox [id number], mid 2.5x23mm  Post Cath EKG: NSR 65bpm no acute ischemia     Plan:  -Formal cath report pending  -Post procedure management/monitoring per protocol  -Access site precautions  -Radial compression band removal at 1730, OOB 1800 if remains HDS and no bleeding complications   -Labs and EKG in am  -NS 0.9% 250ml/hr x 1 bolus: post procedure AARON ppx   -Repeat ECG if any clinical indication or change on tele  -Continue current medical therapy  -Dual anti platelet therapy with aspirin/plavix   -Cont BB with Lopressor 25mg BID, transition to toprol XL 50mg at discharge  -ACEi lisinopril 2.5mg daily    -Cont statin therapy with Lipitor 80mg po qHS   -Educated regarding strict adherence with DAPT   -Educated regarding post procedure management and care  -Discussed the importance of RF modification  -Cardiac rehab info provided/referral and communication to cardiac rehab completed  -F/U outpt in 1-2 weeks with Cardiologist Dr. Butler will see Suzy in [place] office on 9/26/2023  -DISPO: Plan for D/C in am if remains HDS, ECG and labs in am stable and without complications Now s/p [facility] and staged PCI of LAD via RRA with Dr. Butler, tolerated procedure well. Pt arrived to recovery in NAD and HDS, access site stable, no bleed/hematoma, distal pulse +,   Intraprocedurally: Patient rec'd IV sedation, heaprin 8,000 units, and omnipaque 66ml  Findings: 80% lesion prox/mid LAD treated with 2 TANIYA: prox 2.12j99mp, mid 2.5x23mm  Post Cath EKG: NSR 65bpm no acute ischemia     Plan:  -Formal cath report pending  -Post procedure management/monitoring per protocol  -Access site precautions  -Radial compression band removal at 1730, OOB 1800 if remains HDS and no bleeding complications   -Labs and EKG in am  -NS 0.9% 250ml/hr x 1 bolus: post procedure AARON ppx   -Repeat ECG if any clinical indication or change on tele  -Continue current medical therapy  -Dual anti platelet therapy with aspirin/plavix   -Cont BB with Lopressor 25mg BID, transition to toprol XL 50mg at discharge  -ACEi lisinopril 2.5mg daily    -Cont statin therapy with Lipitor 80mg po qHS   -Educated regarding strict adherence with DAPT   -Educated regarding post procedure management and care  -Discussed the importance of RF modification  -Cardiac rehab info provided/referral and communication to cardiac rehab completed  -F/U outpt in 1-2 weeks with Cardiologist Dr. Butler will see Suzy in [place] office on 9/26/2023  -DISPO: Plan for D/C in am if remains HDS, ECG and labs in am stable and without complications

## 2023-09-20 NOTE — PROGRESS NOTE ADULT - SUBJECTIVE AND OBJECTIVE BOX
Department of Cardiology                                                                  [facility]/[place] [address] [address] [facility]                                                            Telephone: [phone]. Fax:[phone]                                                                                      Cardiac Cath NP Note           Patient is a 57y old  Male who presents with a chief complaint of unstable angina (20 Sep 2023 13:51)    Cardiology consulting for CAD  Overnight events: none   Plan: C staged PCI LAD     HPI:  56 y/o male with no significant PMH was sent to the from urgent care for evaluation of chest pain. Patient reported 2 days of intermittent midsternal chest pressure at rest. Non-radiating, no nausea, vomiting, diaphoresis, palpitation, shortness of breath, prior hx. Reported to have ECG with inferior infarct and ST elevation in urgent care, given Aspirin 162mg and sent to the ED. No recent travel, calf pain.      (17 Sep 2023 22:36)      PAST MEDICAL & SURGICAL HISTORY:  No pertinent past medical history          RADIOLOGY & ADDITIONAL STUDIES/DIAGNOSTIC TESTING:      ECHO  FINDINGS:  < from: TTE Echo Complete w/ Contrast w/ Doppler (09.17.23 @ 18:11) >  PHYSICIAN INTERPRETATION:  Left Ventricle: Endocardial visualization was enhanced with intravenous   echo contrast. The left ventricular internal cavity size is normal.  Global LV systolic function was normal. Left ventricular ejection   fraction, by visual estimation, is 50 to 55%.  Right Ventricle: Normal right ventricular size and function.  Left Atrium: The left atrium is normal in size.  Right Atrium: The right atrium is normal in size.  Pericardium: There is no evidence of pericardial effusion.  Mitral Valve: Mitral leaflet mobility is normal. Trace mitral valve   regurgitation is seen. Mild buckling of the anterior marbella valve leaflet.  Tricuspid Valve: Trivial tricuspid regurgitation is visualized.  Aortic Valve: The aortic valve is trileaflet. No evidence of aortic valve   regurgitation is seen.  Pulmonic Valve: Mild pulmonic valve regurgitation.  Aorta: The aortic root is normal in size and structure.  Pulmonary Artery: The main pulmonary artery is normal in size.  Venous: The inferior vena cava was normal sized, with respiratory size   variation greater than 50%.      Summary:   1. Left ventricular ejection fraction, by visual estimation, is 50 to   55%.   2. Normal global left ventricular systolic function.   3. Normal right ventricular size and function.   4. There is no evidence of pericardial effusion.   5. Trace mitral valve regurgitation.   6. Endocardial visualization was enhanced with intravenous echo contrast.    MD Vince Electronically signed on 9/17/2023 at 7:05:22 PM      CATHETERIZATION  FINDINGS:  TANIYA x 1 to RPDL 09/18/23, with plans for staged PCI of the LAD       Allergies    No Known Allergies    Intolerances        MEDICATIONS  (STANDING):  aspirin  chewable 81 milliGRAM(s) Oral daily  atorvastatin 80 milliGRAM(s) Oral at bedtime  clopidogrel Tablet 75 milliGRAM(s) Oral daily  heparin   Injectable 5000 Unit(s) SubCutaneous every 12 hours  lisinopril 2.5 milliGRAM(s) Oral daily  metoprolol tartrate 25 milliGRAM(s) Oral every 12 hours  sodium chloride 0.9% Bolus 250 milliLiter(s) IV Bolus once  sodium chloride 0.9% Bolus 250 milliLiter(s) IV Bolus once    MEDICATIONS  (PRN):  acetaminophen     Tablet .. 650 milliGRAM(s) Oral every 6 hours PRN Temp greater or equal to 38C (100.4F), Mild Pain (1 - 3)  aluminum hydroxide/magnesium hydroxide/simethicone Suspension 30 milliLiter(s) Oral every 4 hours PRN Dyspepsia  melatonin 3 milliGRAM(s) Oral at bedtime PRN Insomnia  ondansetron Injectable 4 milliGRAM(s) IV Push every 8 hours PRN Nausea and/or Vomiting      FAMILY HISTORY:  FH: CVA (cerebrovascular accident) (Mother)    Social: denies tobacco, rare ETOH, no durgus     REVIEW OF SYSTEMS:  General: No fevers/chills, or fatigue  HEENT: No visual disturbances, no hearing loss, no headaches, no epistaxis.  CV: No chest pain,no palpitations, no edema, no orthopnea, no PND, or ALBRIGHT  Respiratory: No dyspnea, no wheeze, no cough.  GI: no nausea/vomiting, no black/bloody stools.  : No hematuria  Musculoskeletal: No myalgias, no arthralgias, no back pain.    Vital Signs Last 24 Hrs  T(C): 36.9 (20 Sep 2023 13:18), Max: 36.9 (20 Sep 2023 04:50)  T(F): 98.4 (20 Sep 2023 13:18), Max: 98.4 (20 Sep 2023 04:50)  HR: 83 (20 Sep 2023 13:18) (69 - 91)  BP: 105/70 (20 Sep 2023 13:18) (99/65 - 120/78  RR: 19 (20 Sep 2023 13:18) (18 - 19)  SpO2: 99% (20 Sep 2023 13:18) (93% - 99%)    Parameters below as of 20 Sep 2023 13:18  Patient On (Oxygen Delivery Method): room air        Daily     Daily     I&O's Detail      PHYSICAL EXAM:   GENERAL: Pt lying comfortably, NAD.  ENMT: PERRL, +EOMI.  NECK: soft, Supple, No JVD,   CHEST/LUNG: Clear to auscultatation bilaterally; No wheezing.  HEART: S1S2+, Regular rate and rhythm; No murmurs.  ABDOMEN: Soft, Nontender, Nondistended; Bowel sounds present.  MUSCULOSKELETAL: Normal range of motion.  SKIN: No rashes or lesions.  NEURO: AAOX3, no focal deficits, no motor r sensory loss.  PSYCH: normal mood.  VASCULAR:   Radial +2 R/+2 L  Femoral +2 R/+2 L  PT +2 R/+2 L  DP +2 R/+2 L      INTERPRETATION OF TELEMETRY:    ECG:    LABS:                        16.0   7.62  )-----------( 133      ( 20 Sep 2023 06:08 )             46.9     09-20    137  |  100  |  21.0<H>  ----------------------------<  90  3.7   |  23.0  |  0.97    Ca    9.2      20 Sep 2023 06:08  Phos  4.5     09-20  Mg     2.0     09-20    TPro  6.9  /  Alb  4.0  /  TBili  1.1  /  DBili  x   /  AST  39  /  ALT  63<H>  /  AlkPhos  69  09-19          Urinalysis Basic - ( 20 Sep 2023 06:08 )    Color: x / Appearance: x / SG: x / pH: x  Gluc: 90 mg/dL / Ketone: x  / Bili: x / Urobili: x   Blood: x / Protein: x / Nitrite: x   Leuk Esterase: x / RBC: x / WBC x   Sq Epi: x / Non Sq Epi: x / Bacteria: x

## 2023-09-21 ENCOUNTER — TRANSCRIPTION ENCOUNTER (OUTPATIENT)
Age: 58
End: 2023-09-21

## 2023-09-21 VITALS
OXYGEN SATURATION: 97 % | HEART RATE: 83 BPM | TEMPERATURE: 98 F | RESPIRATION RATE: 18 BRPM | SYSTOLIC BLOOD PRESSURE: 117 MMHG | DIASTOLIC BLOOD PRESSURE: 78 MMHG

## 2023-09-21 PROBLEM — Z78.9 OTHER SPECIFIED HEALTH STATUS: Chronic | Status: ACTIVE | Noted: 2023-09-17

## 2023-09-21 LAB
ANION GAP SERPL CALC-SCNC: 11 MMOL/L — SIGNIFICANT CHANGE UP (ref 5–17)
BUN SERPL-MCNC: 17.8 MG/DL — SIGNIFICANT CHANGE UP (ref 8–20)
CALCIUM SERPL-MCNC: 8.9 MG/DL — SIGNIFICANT CHANGE UP (ref 8.4–10.5)
CHLORIDE SERPL-SCNC: 102 MMOL/L — SIGNIFICANT CHANGE UP (ref 96–108)
CO2 SERPL-SCNC: 25 MMOL/L — SIGNIFICANT CHANGE UP (ref 22–29)
CREAT SERPL-MCNC: 0.94 MG/DL — SIGNIFICANT CHANGE UP (ref 0.5–1.3)
EGFR: 95 ML/MIN/1.73M2 — SIGNIFICANT CHANGE UP
GLUCOSE SERPL-MCNC: 98 MG/DL — SIGNIFICANT CHANGE UP (ref 70–99)
HCT VFR BLD CALC: 44.9 % — SIGNIFICANT CHANGE UP (ref 39–50)
HGB BLD-MCNC: 15.3 G/DL — SIGNIFICANT CHANGE UP (ref 13–17)
MAGNESIUM SERPL-MCNC: 1.9 MG/DL — SIGNIFICANT CHANGE UP (ref 1.8–2.6)
MCHC RBC-ENTMCNC: 31.1 PG — SIGNIFICANT CHANGE UP (ref 27–34)
MCHC RBC-ENTMCNC: 34.1 GM/DL — SIGNIFICANT CHANGE UP (ref 32–36)
MCV RBC AUTO: 91.3 FL — SIGNIFICANT CHANGE UP (ref 80–100)
PLATELET # BLD AUTO: 119 K/UL — LOW (ref 150–400)
POTASSIUM SERPL-MCNC: 4 MMOL/L — SIGNIFICANT CHANGE UP (ref 3.5–5.3)
POTASSIUM SERPL-SCNC: 4 MMOL/L — SIGNIFICANT CHANGE UP (ref 3.5–5.3)
RBC # BLD: 4.92 M/UL — SIGNIFICANT CHANGE UP (ref 4.2–5.8)
RBC # FLD: 12.3 % — SIGNIFICANT CHANGE UP (ref 10.3–14.5)
SODIUM SERPL-SCNC: 138 MMOL/L — SIGNIFICANT CHANGE UP (ref 135–145)
WBC # BLD: 7.37 K/UL — SIGNIFICANT CHANGE UP (ref 3.8–10.5)
WBC # FLD AUTO: 7.37 K/UL — SIGNIFICANT CHANGE UP (ref 3.8–10.5)

## 2023-09-21 PROCEDURE — 99239 HOSP IP/OBS DSCHRG MGMT >30: CPT

## 2023-09-21 PROCEDURE — C1874: CPT

## 2023-09-21 PROCEDURE — 85027 COMPLETE CBC AUTOMATED: CPT

## 2023-09-21 PROCEDURE — 99285 EMERGENCY DEPT VISIT HI MDM: CPT | Mod: 25

## 2023-09-21 PROCEDURE — 86880 COOMBS TEST DIRECT: CPT

## 2023-09-21 PROCEDURE — 71045 X-RAY EXAM CHEST 1 VIEW: CPT

## 2023-09-21 PROCEDURE — 82550 ASSAY OF CK (CPK): CPT

## 2023-09-21 PROCEDURE — 83036 HEMOGLOBIN GLYCOSYLATED A1C: CPT

## 2023-09-21 PROCEDURE — C9600: CPT | Mod: RC

## 2023-09-21 PROCEDURE — 99232 SBSQ HOSP IP/OBS MODERATE 35: CPT

## 2023-09-21 PROCEDURE — C1769: CPT

## 2023-09-21 PROCEDURE — 86870 RBC ANTIBODY IDENTIFICATION: CPT

## 2023-09-21 PROCEDURE — 85730 THROMBOPLASTIN TIME PARTIAL: CPT

## 2023-09-21 PROCEDURE — 93010 ELECTROCARDIOGRAM REPORT: CPT

## 2023-09-21 PROCEDURE — 82553 CREATINE MB FRACTION: CPT

## 2023-09-21 PROCEDURE — 83735 ASSAY OF MAGNESIUM: CPT

## 2023-09-21 PROCEDURE — C1887: CPT

## 2023-09-21 PROCEDURE — C8929: CPT

## 2023-09-21 PROCEDURE — 92978 ENDOLUMINL IVUS OCT C 1ST: CPT | Mod: LD

## 2023-09-21 PROCEDURE — 86900 BLOOD TYPING SEROLOGIC ABO: CPT

## 2023-09-21 PROCEDURE — C1894: CPT

## 2023-09-21 PROCEDURE — 86850 RBC ANTIBODY SCREEN: CPT

## 2023-09-21 PROCEDURE — 85610 PROTHROMBIN TIME: CPT

## 2023-09-21 PROCEDURE — 93005 ELECTROCARDIOGRAM TRACING: CPT

## 2023-09-21 PROCEDURE — 93458 L HRT ARTERY/VENTRICLE ANGIO: CPT | Mod: 59

## 2023-09-21 PROCEDURE — 84100 ASSAY OF PHOSPHORUS: CPT

## 2023-09-21 PROCEDURE — 86901 BLOOD TYPING SEROLOGIC RH(D): CPT

## 2023-09-21 PROCEDURE — C1725: CPT

## 2023-09-21 PROCEDURE — 80053 COMPREHEN METABOLIC PANEL: CPT

## 2023-09-21 PROCEDURE — C1753: CPT

## 2023-09-21 PROCEDURE — 86905 BLOOD TYPING RBC ANTIGENS: CPT

## 2023-09-21 PROCEDURE — 80061 LIPID PANEL: CPT

## 2023-09-21 PROCEDURE — 80048 BASIC METABOLIC PNL TOTAL CA: CPT

## 2023-09-21 PROCEDURE — 85025 COMPLETE CBC W/AUTO DIFF WBC: CPT

## 2023-09-21 PROCEDURE — 84484 ASSAY OF TROPONIN QUANT: CPT

## 2023-09-21 PROCEDURE — 36415 COLL VENOUS BLD VENIPUNCTURE: CPT

## 2023-09-21 RX ORDER — ASPIRIN/CALCIUM CARB/MAGNESIUM 324 MG
1 TABLET ORAL
Qty: 30 | Refills: 0
Start: 2023-09-21 | End: 2023-10-20

## 2023-09-21 RX ORDER — LISINOPRIL 2.5 MG/1
1 TABLET ORAL
Qty: 30 | Refills: 0
Start: 2023-09-21 | End: 2023-10-20

## 2023-09-21 RX ORDER — CLOPIDOGREL BISULFATE 75 MG/1
1 TABLET, FILM COATED ORAL
Qty: 30 | Refills: 0
Start: 2023-09-21 | End: 2023-10-20

## 2023-09-21 RX ORDER — METOPROLOL TARTRATE 50 MG
1 TABLET ORAL
Qty: 60 | Refills: 0
Start: 2023-09-21 | End: 2023-10-20

## 2023-09-21 RX ORDER — ATORVASTATIN CALCIUM 80 MG/1
1 TABLET, FILM COATED ORAL
Qty: 30 | Refills: 0
Start: 2023-09-21 | End: 2023-10-20

## 2023-09-21 RX ADMIN — Medication 25 MILLIGRAM(S): at 05:24

## 2023-09-21 RX ADMIN — LISINOPRIL 2.5 MILLIGRAM(S): 2.5 TABLET ORAL at 05:25

## 2023-09-21 RX ADMIN — HEPARIN SODIUM 5000 UNIT(S): 5000 INJECTION INTRAVENOUS; SUBCUTANEOUS at 05:24

## 2023-09-21 RX ADMIN — CLOPIDOGREL BISULFATE 75 MILLIGRAM(S): 75 TABLET, FILM COATED ORAL at 12:01

## 2023-09-21 RX ADMIN — Medication 81 MILLIGRAM(S): at 12:01

## 2023-09-21 NOTE — DISCHARGE NOTE PROVIDER - PROVIDER TOKENS
PROVIDER:[TOKEN:[[id number]],FOLLOWUP:[1-3 days]],FREE:[LAST:[PCP],PHONE:[(   )    -],FAX:[(   )    -],FOLLOWUP:[1-3 days]],PROVIDER:[TOKEN:[[id number]],FOLLOWUP:[1 week]]

## 2023-09-21 NOTE — DISCHARGE NOTE PROVIDER - NSDCCPCAREPLAN_GEN_ALL_CORE_FT
PRINCIPAL DISCHARGE DIAGNOSIS  Diagnosis: Unstable angina  Assessment and Plan of Treatment: - ECG with no evidence of ischemia.   - TTE with preserved ejection fraction, no wall motion abnormality.   - Underwent cardiac cath, with staged PCI and stents placed on 9/18 and 9/20.   - Continue with cardiac medications as directed and follow up outpatient with your PCP and Cardiology.         SECONDARY DISCHARGE DIAGNOSES  Diagnosis: CAD (coronary artery disease)  Assessment and Plan of Treatment: - Continue with cardiac medications as directed and follow up outpatient with your PCP and Cardiology.

## 2023-09-21 NOTE — PROGRESS NOTE ADULT - NS ATTEND AMEND GEN_ALL_CORE FT
58 y/o male with recent onset angina ( unstable ) who presented to urgent care center with chest pain and EKG showing transient ST elevation in the inferior leads   presented to [facility] where he had no chest pain and EKG normalized   High risk ACS , stuttering MOY , elevated TNI now consistent with NSTEMI   indicated for LHC +/- PCI .
Patient presented to the hospital with ACS. Culprit rPDA. LAD severe disease, calcified disease.   - S/p PCI of RCA on Monday  - S/p PCI of the LAD yesterday  - Feels well today and has no complaints. No issues overnight. Tele without arrhythmia.   - Continue ASA, plavix  - continue statin  - monitor on tele.  - F/U within one week.
Patient taken for cath today. Culprit rPDA. LAD severe disease, diffuse. Ostial disease as well, to be addressed later.   - Continue ASA, ticagrelor  - continue statin  - monitor on tele
Patient taken for cath today. Culprit rPDA. LAD severe disease, diffuse. Ostial disease as well, to be addressed later.   - Continue ASA, ticagrelor  - continue statin  - monitor on tele.  - NPO after midnight for LAD intervention with me tomorrow.

## 2023-09-21 NOTE — DISCHARGE NOTE PROVIDER - CARE PROVIDER_API CALL
Girish Ramachandran  Cardiovascular Disease  [address] [facility], [address] [place] [address]  Phone: [phone]  Fax: [phone]  Follow Up Time: 1-3 days    PCP,   Phone: (   )    -  Fax: (   )    -  Follow Up Time: 1-3 days    Antoine Butler  Interventional Cardiology  [address] [facility], [address] [place] [id number]  Phone: [phone]  Fax: [phone]  Follow Up Time: 1 week

## 2023-09-21 NOTE — DISCHARGE NOTE PROVIDER - ATTENDING DISCHARGE PHYSICAL EXAMINATION:
Vital Signs Last 24 Hrs  T(C): 36.7 (21 Sep 2023 08:43), Max: 36.9 (20 Sep 2023 13:18)  T(F): 98 (21 Sep 2023 08:43), Max: 98.4 (20 Sep 2023 13:18)  HR: 73 (21 Sep 2023 08:43) (69 - 90)  BP: 109/72 (21 Sep 2023 08:43) (105/70 - 143/94)  BP(mean): 78 (20 Sep 2023 16:30) (78 - 78)  RR: 18 (21 Sep 2023 08:43) (14 - 20)  SpO2: 97% (21 Sep 2023 08:43) (95% - 100%)    Parameters below as of 21 Sep 2023 08:43  Patient On (Oxygen Delivery Method): room air    GENERAL: pt examined bedside, laying comfortably in bed in NAD  HEENT: NC/AT, moist oral mucosa, clear conjunctiva, sclera nonicteric  RESPIRATORY: no wheezing, rhonchi, rales  CARDIOVASCULAR: RRR, normal S1 and S2  ABDOMEN: soft, NT/ND, normoactive bowel sounds, no rebound/guarding  EXTREMITIES: No cynaosis, no clubbing, no lower extremity edema  NEUROLOGY: A+O to person, place, and time, no focal neurologic deficits appreciated   SKIN: No rashes or no palpable lesions

## 2023-09-21 NOTE — PROGRESS NOTE ADULT - SUBJECTIVE AND OBJECTIVE BOX
[facility]                                                         CARDIOLOGY AT [address], [place] [address]                                                         Telephone: [phone]. Fax:[phone]                                                                             PROGRESS NOTE    Reason for follow up: CAD  Update: Patient underwent PCI with TANIYA x 2 to LAD yesterday with Dr. Butler. Patient states that he feels well today, and understands the importance of taking DAPT.       Review of symptoms:   Cardiac:  No chest pain. No dyspnea. No palpitations.  Respiratory: no cough. No dyspnea  Gastrointestinal: No diarrhea. No abdominal pain. No bleeding.   Neuro: No focal neuro complaints.    Vitals:  T(C): 36.7 (09-21-23 @ 08:43), Max: 36.9 (09-20-23 @ 13:18)  HR: 73 (09-21-23 @ 08:43) (69 - 90)  BP: 109/72 (09-21-23 @ 08:43) (105/70 - 143/94)  RR: 18 (09-21-23 @ 08:43) (14 - 20)  SpO2: 97% (09-21-23 @ 08:43) (95% - 100%)  Wt(kg): --  I&O's Summary    Weight (kg): 85.3 (09-18 @ 07:55)    PHYSICAL EXAM:  Appearance: Comfortable. No acute distress  HEENT:  Atraumatic. Normocephalic.  Normal oral mucosa  Neurologic: A & O x 3, no gross focal deficits.  Cardiovascular: RRR S1 S2, No murmur, no rubs/gallops. No JVD  Respiratory: Lungs clear to auscultation, unlabored   Gastrointestinal:  Soft, Non-tender, + BS  Lower Extremities: 2+ Peripheral Pulses, No clubbing, cyanosis, or edema, right wrist benign s/p cath.  No bleeding, no ecchymosis, no hematoma. Extremity Warm to touch, with palpable distal pulses, and brisk capillary refill.   Psychiatry: Patient is calm. No agitation.   Skin: warm and dry.    CURRENT CARDIAC MEDICATIONS:  lisinopril 2.5 milliGRAM(s) Oral daily  metoprolol tartrate 25 milliGRAM(s) Oral every 12 hours      CURRENT OTHER MEDICATIONS:  acetaminophen     Tablet .. 650 milliGRAM(s) Oral every 6 hours PRN Temp greater or equal to 38C (100.4F), Mild Pain (1 - 3)  melatonin 3 milliGRAM(s) Oral at bedtime PRN Insomnia  ondansetron Injectable 4 milliGRAM(s) IV Push every 8 hours PRN Nausea and/or Vomiting  aluminum hydroxide/magnesium hydroxide/simethicone Suspension 30 milliLiter(s) Oral every 4 hours PRN Dyspepsia  atorvastatin 80 milliGRAM(s) Oral at bedtime  aspirin  chewable 81 milliGRAM(s) Oral daily  clopidogrel Tablet 75 milliGRAM(s) Oral daily  heparin   Injectable 5000 Unit(s) SubCutaneous every 12 hours  sodium chloride 0.9% Bolus 250 milliLiter(s) IV Bolus once, Stop order after: 1 Doses  sodium chloride 0.9% Bolus 250 milliLiter(s) IV Bolus once, Stop order after: 1 Doses      LABS:	 	  ( 17 Sep 2023 19:46 )  Troponin T  0.08<H>,  CPK  X    , CKMB  X    , BNP X        , ( 17 Sep 2023 18:55 )  Troponin T  0.06 ,  CPK  214<H>, CKMB  X    , BNP X        , ( 17 Sep 2023 15:50 )  Troponin T  0.06 ,  CPK  X    , CKMB  X    , BNP X                                  15.3   7.37  )-----------( 119      ( 21 Sep 2023 05:18 )             44.9     09-21    138  |  102  |  17.8  ----------------------------<  98  4.0   |  25.0  |  0.94    Ca    8.9      21 Sep 2023 05:18  Phos  4.5     09-20  Mg     1.9     09-21      PT/INR/PTT ( 18 Sep 2023 01:17 )                       :                       :      X            :       125.5                 .        .                   .              .           .       X           .                                       Lipid Profile: Date: 09-17 @ 18:55  Total cholesterol 165; Direct LDL: --; HDL: 45; Triglycerides:144    HgA1c:   TSH:     TELEMETRY: SR, ST  ECG:    DIAGNOSTIC TESTING:  [ ] Echocardiogram:   < from: TTE Echo Complete w/ Contrast w/ Doppler (09.17.23 @ 18:11) >  PHYSICIAN INTERPRETATION:  Left Ventricle: Endocardial visualization was enhanced with intravenous   echo contrast. The left ventricular internal cavity size is normal.  Global LV systolic function was normal. Left ventricular ejection   fraction, by visual estimation, is 50 to 55%.  Right Ventricle: Normal right ventricular size and function.  Left Atrium: The left atrium is normal in size.  Right Atrium: The right atrium is normal in size.  Pericardium: There is no evidence of pericardial effusion.  Mitral Valve: Mitral leaflet mobility is normal. Trace mitral valve   regurgitation is seen. Mild buckling of the anterior marbella valve leaflet.  Tricuspid Valve: Trivial tricuspid regurgitation is visualized.  Aortic Valve: The aortic valve is trileaflet. No evidence of aortic valve   regurgitation is seen.  Pulmonic Valve: Mild pulmonic valve regurgitation.  Aorta: The aortic root is normal in size and structure.  Pulmonary Artery: The main pulmonary artery is normal in size.  Venous: The inferior vena cava was normal sized, with respiratory size   variation greater than 50%.      Summary:   1. Left ventricular ejection fraction, by visual estimation, is 50 to   55%.   2. Normal global left ventricular systolic function.   3. Normal right ventricular size and function.   4. There is no evidence of pericardial effusion.   5. Trace mitral valve regurgitation.   6. Endocardial visualization was enhanced with intravenous echo contrast.    MD Vince Electronically signed on 9/17/2023 at 7:05:22 PM    < end of copied text >    [ ]  Catheterization:    [ ] Stress Test:      OTHER:

## 2023-09-21 NOTE — DISCHARGE NOTE NURSING/CASE MANAGEMENT/SOCIAL WORK - PATIENT PORTAL LINK FT
You can access the FollowMyHealth Patient Portal offered by [facility] by registering at the following website: [email or web address]. By joining [facility]’s FollowMyHealth portal, you will also be able to view your health information using other applications (apps) compatible with our system.

## 2023-09-21 NOTE — PROGRESS NOTE ADULT - PROVIDER SPECIALTY LIST ADULT
Cardiology
Hospitalist
Intervent Cardiology
Hospitalist
Hospitalist
Intervent Cardiology
Cardiology
Intervent Cardiology
Cardiology

## 2023-09-21 NOTE — DISCHARGE NOTE PROVIDER - NSDCMRMEDTOKEN_GEN_ALL_CORE_FT
ticagrelor 90 mg oral tablet: 1 tab(s) orally 2 times a day   aspirin 81 mg oral tablet, chewable: 1 tab(s) orally once a day  atorvastatin 80 mg oral tablet: 1 tab(s) orally once a day (at bedtime)  clopidogrel 75 mg oral tablet: 1 tab(s) orally once a day  lisinopril 2.5 mg oral tablet: 1 tab(s) orally once a day  metoprolol tartrate 25 mg oral tablet: 1 tab(s) orally every 12 hours

## 2023-09-21 NOTE — PROGRESS NOTE ADULT - PROBLEM SELECTOR PLAN 1
- Patient s/p C with TANIYA x 1 to RPDL, with plans for LAD PCI 09/20/23.   - Continue aspirin, brilinta, lipitor, metoprolol, and lisinopril.   - Echocardiogram with EF 50-55%, no RWMA, no significant valvular abnormalities.    - NPO after midnight.   - PCI tomorrow with Dr. Butler.  - Cardiac rehab info provided/referral and communication to cardiac rehab completed.
- Patient s/p LHC with TANIYA x 1 to RPDL.   - Now s/p TANIYA x 2 to the LAD with Dr. Butler.   - Patient plavix loaded 600mg PO x 1.   - Will transition to Plavix 75mg PO qday at this time.   - Continue aspirin, lipitor, metoprolol, and lisinopril.   - Echocardiogram with EF 50-55%, no RWMA, no significant valvular abnormalities.    - Pt with stable vital signs, telemetry stable, physical exam unremarkable and unchanged from pre-procedural baseline, neurovascularly intact, ambulating, eating, review of systems completely negative. pt verbalized an understanding of the discharge teaching. Patient to follow up with Dr. Butler.
.  - Troponin 0.06, 0.08  - Cont asa, Brilinta, statin  - d/c heparin  - C- PCI to RPDL  - plan for PCI to LAD wednesday  - monitor renal function  - TTE EF 50-55%

## 2023-09-21 NOTE — PROGRESS NOTE ADULT - ASSESSMENT
A/P: 58 y/o male with good general health who presents to the ER with complaints of chest pressure for 2 days.  Pressure can occur with activity and rest.  Last episode was this am at urgent care and it lasted approx 10 minutes and her was referred to the hospital for evaluation.  He is chest pain free at this time.  Trop x 1 0.06  Ekg no ischemic changes  Ekg at urgent care inf infarct with st elevation down on arrival to ER without any chest discomfort

## 2023-09-21 NOTE — DISCHARGE NOTE PROVIDER - NSDCFUSCHEDAPPT_GEN_ALL_CORE_FT
[facility]  CARDIOLOGY [address] [facility]  Scheduled Appointment: 09/26/2023

## 2023-09-21 NOTE — DISCHARGE NOTE PROVIDER - HOSPITAL COURSE
57 year old male with no significant PMHx, presented to [facility] ED with c/o intermitted chest pain at rest after ECG at urgent care reported inferior infarction with ST elevation. In the ED, ECG was without evidence of acute ischemia. Troponin returned negative. Cardiology was consulted, and patient was admitted for workup of unstable angina. TTE revealed preserved LVEF with no WMA. Troponin slightly trended up to 0.08. Patient underwent LHC on 9/18, which demonstrated diffuse, severe LAD disease, rPDA disease, and ostial disease. Staged PCI was performed with TANIYA to rPDA on 9/18, and TANIYA x2 of the LAD on 09/20/23. Patient remained hemodynamically stable post-procedure. At this time, patient is medically stable for discharge with close outpatient follow up.

## 2023-09-26 ENCOUNTER — APPOINTMENT (OUTPATIENT)
Dept: INTERNAL MEDICINE | Facility: CLINIC | Age: 58
End: 2023-09-26
Payer: COMMERCIAL

## 2023-09-26 ENCOUNTER — NON-APPOINTMENT (OUTPATIENT)
Age: 58
End: 2023-09-26

## 2023-09-26 ENCOUNTER — APPOINTMENT (OUTPATIENT)
Dept: CARDIOLOGY | Facility: CLINIC | Age: 58
End: 2023-09-26
Payer: COMMERCIAL

## 2023-09-26 VITALS
WEIGHT: 185 LBS | BODY MASS INDEX: 28.04 KG/M2 | SYSTOLIC BLOOD PRESSURE: 112 MMHG | DIASTOLIC BLOOD PRESSURE: 69 MMHG | HEIGHT: 68 IN | HEART RATE: 66 BPM

## 2023-09-26 VITALS
HEIGHT: 68 IN | HEART RATE: 69 BPM | DIASTOLIC BLOOD PRESSURE: 54 MMHG | OXYGEN SATURATION: 97 % | BODY MASS INDEX: 28.04 KG/M2 | WEIGHT: 185 LBS | SYSTOLIC BLOOD PRESSURE: 98 MMHG

## 2023-09-26 VITALS — SYSTOLIC BLOOD PRESSURE: 98 MMHG | DIASTOLIC BLOOD PRESSURE: 58 MMHG

## 2023-09-26 VITALS — SYSTOLIC BLOOD PRESSURE: 104 MMHG | DIASTOLIC BLOOD PRESSURE: 62 MMHG

## 2023-09-26 DIAGNOSIS — Z09 ENCOUNTER FOR FOLLOW-UP EXAMINATION AFTER COMPLETED TREATMENT FOR CONDITIONS OTHER THAN MALIGNANT NEOPLASM: ICD-10-CM

## 2023-09-26 PROCEDURE — 99215 OFFICE O/P EST HI 40 MIN: CPT | Mod: 25

## 2023-09-26 PROCEDURE — 99205 OFFICE O/P NEW HI 60 MIN: CPT

## 2023-09-26 PROCEDURE — 93000 ELECTROCARDIOGRAM COMPLETE: CPT

## 2023-09-26 RX ORDER — DICLOFENAC SODIUM 10 MG/G
1 GEL TOPICAL
Qty: 1 | Refills: 0 | Status: DISCONTINUED | COMMUNITY
Start: 2020-09-28 | End: 2023-09-26

## 2023-09-26 RX ORDER — ASPIRIN 81 MG
81 TABLET,CHEWABLE ORAL DAILY
Refills: 0 | Status: DISCONTINUED | COMMUNITY
End: 2023-09-26

## 2023-09-26 RX ORDER — TRAZODONE HYDROCHLORIDE 50 MG/1
50 TABLET ORAL
Refills: 0 | Status: DISCONTINUED | COMMUNITY
End: 2023-09-26

## 2023-10-20 RX ORDER — CLOPIDOGREL BISULFATE 75 MG/1
75 TABLET, FILM COATED ORAL DAILY
Qty: 90 | Refills: 3 | Status: ACTIVE | COMMUNITY
Start: 1900-01-01 | End: 1900-01-01

## 2023-10-20 RX ORDER — ATORVASTATIN CALCIUM 80 MG/1
80 TABLET, FILM COATED ORAL
Qty: 90 | Refills: 3 | Status: ACTIVE | COMMUNITY
Start: 1900-01-01 | End: 1900-01-01

## 2023-10-20 RX ORDER — KRILL/OM-3/DHA/EPA/PHOSPHO/AST 1000-230MG
81 CAPSULE ORAL
Qty: 90 | Refills: 3 | Status: ACTIVE | COMMUNITY
Start: 2023-09-26 | End: 1900-01-01

## 2023-10-26 ENCOUNTER — APPOINTMENT (OUTPATIENT)
Dept: CARDIOLOGY | Facility: CLINIC | Age: 58
End: 2023-10-26

## 2023-10-26 ENCOUNTER — APPOINTMENT (OUTPATIENT)
Dept: CARDIOLOGY | Facility: CLINIC | Age: 58
End: 2023-10-26
Payer: COMMERCIAL

## 2023-10-26 VITALS — BODY MASS INDEX: 28.74 KG/M2 | WEIGHT: 189 LBS

## 2023-10-26 VITALS — DIASTOLIC BLOOD PRESSURE: 64 MMHG | OXYGEN SATURATION: 97 % | HEART RATE: 64 BPM | SYSTOLIC BLOOD PRESSURE: 98 MMHG

## 2023-10-26 PROCEDURE — 99214 OFFICE O/P EST MOD 30 MIN: CPT

## 2023-11-30 ENCOUNTER — APPOINTMENT (OUTPATIENT)
Dept: CARDIOLOGY | Facility: CLINIC | Age: 58
End: 2023-11-30
Payer: COMMERCIAL

## 2023-11-30 DIAGNOSIS — G47.33 OBSTRUCTIVE SLEEP APNEA (ADULT) (PEDIATRIC): ICD-10-CM

## 2023-11-30 DIAGNOSIS — R79.89 OTHER SPECIFIED ABNORMAL FINDINGS OF BLOOD CHEMISTRY: ICD-10-CM

## 2023-11-30 DIAGNOSIS — Z01.818 ENCOUNTER FOR OTHER PREPROCEDURAL EXAMINATION: ICD-10-CM

## 2023-11-30 PROCEDURE — 93306 TTE W/DOPPLER COMPLETE: CPT

## 2023-12-05 ENCOUNTER — TRANSCRIPTION ENCOUNTER (OUTPATIENT)
Age: 58
End: 2023-12-05

## 2023-12-07 ENCOUNTER — APPOINTMENT (OUTPATIENT)
Dept: CARDIOLOGY | Facility: CLINIC | Age: 58
End: 2023-12-07
Payer: COMMERCIAL

## 2023-12-07 VITALS
WEIGHT: 185 LBS | OXYGEN SATURATION: 100 % | RESPIRATION RATE: 16 BRPM | SYSTOLIC BLOOD PRESSURE: 96 MMHG | DIASTOLIC BLOOD PRESSURE: 64 MMHG | BODY MASS INDEX: 28.13 KG/M2 | HEART RATE: 68 BPM

## 2023-12-07 DIAGNOSIS — E78.9 DISORDER OF LIPOPROTEIN METABOLISM, UNSPECIFIED: ICD-10-CM

## 2023-12-07 PROCEDURE — 99214 OFFICE O/P EST MOD 30 MIN: CPT

## 2024-01-04 ENCOUNTER — TRANSCRIPTION ENCOUNTER (OUTPATIENT)
Age: 59
End: 2024-01-04

## 2024-01-16 ENCOUNTER — APPOINTMENT (OUTPATIENT)
Dept: INTERNAL MEDICINE | Facility: CLINIC | Age: 59
End: 2024-01-16
Payer: COMMERCIAL

## 2024-01-16 VITALS
WEIGHT: 180 LBS | SYSTOLIC BLOOD PRESSURE: 114 MMHG | HEART RATE: 71 BPM | HEIGHT: 68 IN | DIASTOLIC BLOOD PRESSURE: 81 MMHG | BODY MASS INDEX: 27.28 KG/M2

## 2024-01-16 DIAGNOSIS — E55.9 VITAMIN D DEFICIENCY, UNSPECIFIED: ICD-10-CM

## 2024-01-16 DIAGNOSIS — I25.10 ATHEROSCLEROTIC HEART DISEASE OF NATIVE CORONARY ARTERY W/OUT ANGINA PECTORIS: ICD-10-CM

## 2024-01-16 DIAGNOSIS — R79.9 ABNORMAL FINDING OF BLOOD CHEMISTRY, UNSPECIFIED: ICD-10-CM

## 2024-01-16 DIAGNOSIS — R53.83 OTHER FATIGUE: ICD-10-CM

## 2024-01-16 DIAGNOSIS — Z98.61 ATHEROSCLEROTIC HEART DISEASE OF NATIVE CORONARY ARTERY W/OUT ANGINA PECTORIS: ICD-10-CM

## 2024-01-16 DIAGNOSIS — N40.0 BENIGN PROSTATIC HYPERPLASIA WITHOUT LOWER URINARY TRACT SYMPMS: ICD-10-CM

## 2024-01-16 PROCEDURE — 99214 OFFICE O/P EST MOD 30 MIN: CPT | Mod: 25

## 2024-01-16 PROCEDURE — 36415 COLL VENOUS BLD VENIPUNCTURE: CPT

## 2024-01-16 RX ORDER — LISINOPRIL 2.5 MG/1
2.5 TABLET ORAL DAILY
Qty: 90 | Refills: 0 | Status: COMPLETED | COMMUNITY
Start: 1900-01-01 | End: 2024-01-16

## 2024-01-16 NOTE — PLAN
[FreeTextEntry1] :  CAD s/p stent- patient will continue to take medication as prescribed by cardiologist   MARTI- patient will continue with CPAP   Prior to appointment and during encounter with patient extensive medical records were reviewed including but not limited to, Hospital records, out patient records, laboratory data and microbiology data In addition extensive time was also spent in reviewing diagnostic studies.   Total encounter total time 40 mins >50% of time spent counseling/coordinating care   Counseling included abnormal lab results, differential diagnoses, treatment options, risks and benefits, lifestyle changes, current condition, medications, and dose adjustments.  The patient was interactive, attentive, asked questions, and verbalized understanding

## 2024-01-16 NOTE — HISTORY OF PRESENT ILLNESS
[FreeTextEntry1] : follow up chronic medical conditions.  [de-identified] : Mr. SANJEEV ESPARZA is a 58 year male with a PMH of  TANIYA x2 of the LAD on 09/20/23, comes to the office for follow up of chronic medical conditions. Patient denies fever, cough SOB. No other complaints at this time.

## 2024-01-16 NOTE — HEALTH RISK ASSESSMENT
[Yes] : Yes [Monthly or less (1 pt)] : Monthly or less (1 point) [1 or 2 (0 pts)] : 1 or 2 (0 points) [Never (0 pts)] : Never (0 points) [No] : In the past 12 months have you used drugs other than those required for medical reasons? No [0] : 2) Feeling down, depressed, or hopeless: Not at all (0) [PHQ-2 Negative - No further assessment needed] : PHQ-2 Negative - No further assessment needed [Audit-CScore] : 1 [[id number]] : 0 [Never] : Never

## 2024-01-17 LAB
25(OH)D3 SERPL-MCNC: 27.5 NG/ML
ALBUMIN SERPL ELPH-MCNC: 4.7 G/DL
ALP BLD-CCNC: 85 U/L
ALT SERPL-CCNC: 34 U/L
ANION GAP SERPL CALC-SCNC: 12 MMOL/L
AST SERPL-CCNC: 25 U/L
BASOPHILS # BLD AUTO: 0.01 K/UL
BASOPHILS NFR BLD AUTO: 0.2 %
BILIRUB SERPL-MCNC: 0.8 MG/DL
BUN SERPL-MCNC: 12 MG/DL
CALCIUM SERPL-MCNC: 9.6 MG/DL
CHLORIDE SERPL-SCNC: 102 MMOL/L
CHOLEST SERPL-MCNC: 114 MG/DL
CO2 SERPL-SCNC: 26 MMOL/L
CREAT SERPL-MCNC: 0.87 MG/DL
EGFR: 101 ML/MIN/1.73M2
EOSINOPHIL # BLD AUTO: 0.15 K/UL
EOSINOPHIL NFR BLD AUTO: 2.7 %
ESTIMATED AVERAGE GLUCOSE: 123 MG/DL
GLUCOSE SERPL-MCNC: 92 MG/DL
HBA1C MFR BLD HPLC: 5.9 %
HCT VFR BLD CALC: 45.5 %
HDLC SERPL-MCNC: 42 MG/DL
HGB BLD-MCNC: 14.6 G/DL
IMM GRANULOCYTES NFR BLD AUTO: 0.5 %
LDLC SERPL CALC-MCNC: 37 MG/DL
LYMPHOCYTES # BLD AUTO: 1.23 K/UL
LYMPHOCYTES NFR BLD AUTO: 22.2 %
MAGNESIUM SERPL-MCNC: 2.1 MG/DL
MAN DIFF?: NORMAL
MCHC RBC-ENTMCNC: 30.5 PG
MCHC RBC-ENTMCNC: 32.1 GM/DL
MCV RBC AUTO: 95.2 FL
MONOCYTES # BLD AUTO: 0.37 K/UL
MONOCYTES NFR BLD AUTO: 6.7 %
NEUTROPHILS # BLD AUTO: 3.75 K/UL
NEUTROPHILS NFR BLD AUTO: 67.7 %
NONHDLC SERPL-MCNC: 72 MG/DL
PLATELET # BLD AUTO: 117 K/UL
POTASSIUM SERPL-SCNC: 4.3 MMOL/L
PROT SERPL-MCNC: 7.2 G/DL
PSA SERPL-MCNC: 1.26 NG/ML
RBC # BLD: 4.78 M/UL
RBC # FLD: 13 %
SODIUM SERPL-SCNC: 141 MMOL/L
TRIGL SERPL-MCNC: 223 MG/DL
TSH SERPL-ACNC: 1.48 UIU/ML
WBC # FLD AUTO: 5.54 K/UL

## 2024-02-08 ENCOUNTER — APPOINTMENT (OUTPATIENT)
Dept: CARDIOLOGY | Facility: CLINIC | Age: 59
End: 2024-02-08
Payer: COMMERCIAL

## 2024-02-08 VITALS
HEIGHT: 68 IN | BODY MASS INDEX: 28.19 KG/M2 | OXYGEN SATURATION: 98 % | HEART RATE: 72 BPM | WEIGHT: 186 LBS | DIASTOLIC BLOOD PRESSURE: 76 MMHG | SYSTOLIC BLOOD PRESSURE: 122 MMHG

## 2024-02-08 DIAGNOSIS — I25.10 ATHEROSCLEROTIC HEART DISEASE OF NATIVE CORONARY ARTERY W/OUT ANGINA PECTORIS: ICD-10-CM

## 2024-02-08 DIAGNOSIS — R73.03 PREDIABETES.: ICD-10-CM

## 2024-02-08 DIAGNOSIS — I25.110 ATHEROSCLEROTIC HEART DISEASE OF NATIVE CORONARY ARTERY WITH UNSTABLE ANGINA PECTORIS: ICD-10-CM

## 2024-02-08 DIAGNOSIS — I25.2 OLD MYOCARDIAL INFARCTION: ICD-10-CM

## 2024-02-08 PROCEDURE — 93000 ELECTROCARDIOGRAM COMPLETE: CPT

## 2024-02-08 PROCEDURE — G2211 COMPLEX E/M VISIT ADD ON: CPT | Mod: NC,1L

## 2024-02-08 PROCEDURE — 99214 OFFICE O/P EST MOD 30 MIN: CPT | Mod: 25

## 2024-02-08 RX ORDER — METOPROLOL SUCCINATE 25 MG/1
25 TABLET, EXTENDED RELEASE ORAL
Qty: 90 | Refills: 1 | Status: ACTIVE | COMMUNITY
Start: 2024-02-08 | End: 1900-01-01

## 2024-02-08 RX ORDER — METOPROLOL TARTRATE 25 MG/1
25 TABLET, FILM COATED ORAL TWICE DAILY
Qty: 180 | Refills: 1 | Status: DISCONTINUED | COMMUNITY
Start: 1900-01-01 | End: 2024-02-08

## 2024-03-06 PROBLEM — I25.10 CAD (CORONARY ARTERY DISEASE): Status: ACTIVE | Noted: 2023-09-26

## 2024-03-06 PROBLEM — I25.110 UNSTABLE ANGINA PECTORIS DUE TO CORONARY ARTERIOSCLEROSIS: Status: RESOLVED | Noted: 2023-09-26 | Resolved: 2023-09-26

## 2024-03-06 PROBLEM — I25.2 STATUS POST NON-ST ELEVATION MYOCARDIAL INFARCTION (NSTEMI): Status: ACTIVE | Noted: 2023-09-26

## 2024-03-06 NOTE — HISTORY OF PRESENT ILLNESS
[FreeTextEntry1] : 56 y/o male, nonsmoker, with a history of MARTI compliant with CPAP, presented to [facility] with intermittent chest pressure x 2 days at rest, after an ECG at urgent care, given ASA 81 mg x 2, reported ST elevations inferiorly, which were resolved in [facility] ER, troponin negative. TTE 9/17/23 revealed LVEF 50-55%. Peak troponin 0.08. Pt underwent LHC 9/18. diffuse LAD disease s/p TANIYA ostial RPDA, distal RCA. and TANIYA x 2 LAD on 9/20/2023.  12/7/2023 Presents today for follow up. Doing well. No chest pain or shortness of breath. BP very well controlled. Echocardiogram showed normal LV size and function. No wall motion abnormalities. No issues with medications. Can tolerate working out, which he has starting doing again.   Otherwise, denies orthopnea, paroxysmal nocturnal dyspnea, lower extremity edema, unexplained weight gain or dyspnea on exertion.  3/6/2024 Presents for fuv today. Feels well overall. Has no complaints. Denies cp or sob.   Otherwise, denies orthopnea, paroxysmal nocturnal dyspnea, lower extremity edema, unexplained weight gain or dyspnea on exertion.  /76, well controlled.

## 2024-03-06 NOTE — PHYSICAL EXAM
[Normal Conjunctiva] : normal conjunctiva [Normal] : normal venous pressure, no carotid bruit [Normal S1, S2] : normal S1, S2 [No Murmur] : no murmur [No Rub] : no rub [No Gallop] : no gallop [Clear Lung Fields] : clear lung fields [Good Air Entry] : good air entry [No Respiratory Distress] : no respiratory distress  [Non Tender] : non-tender [Soft] : abdomen soft [Normal Bowel Sounds] : normal bowel sounds [Normal Gait] : normal gait [No Edema] : no edema [No Cyanosis] : no cyanosis [Normal Radial B/L] : normal radial B/L [Moves all extremities] : moves all extremities [Normal Speech] : normal speech [Alert and Oriented] : alert and oriented [Normal memory] : normal memory [de-identified] : RRA site well healed, no hematoma, non tender 2+ radial  pulses, mild resolving ecchymosis

## 2024-03-06 NOTE — CARDIOLOGY SUMMARY
[de-identified] : 09/26/2023 Sinus WNL 67 [de-identified] : 12/2023 1. Left ventricular cavity is normal. Left ventricular systolic function is normal with an ejection fraction of 60 % by Pires's method of disks with an ejection fraction visually estimated at 55 to 60 %. 2. Normal left ventricular diastolic function. 3. The left atrium is normal. 4. Normal right ventricular cavity size and systolic function. 5. The right atrium is normal in size. 6. No pericardial effusion seen. 7. Compared to the transthoracic echocardiogram performed on 9/17/2023 there have been no significant interval changes. [de-identified] : 9/2023 PCI to ostial rPDA (culprit) PCI with TANIYA x 2 to prox and mid LAD (nonculprit)

## 2024-03-06 NOTE — DISCUSSION/SUMMARY
[FreeTextEntry1] : A/P 56 y/o male, nonsmoker, with a history of MARTI compliant with CPAP, presented to [facility] with intermittent chest pressure x 2 days at rest, after an ECG at urgent care, given ASA 81 mg x 2, reported ST elevations inferiorly, which were resolved in [facility] ER, troponin negative. TTE 9/17/23 revealed LVEF 50-55%. Peak troponin 0.08. Pt underwent LHC 9/18. diffuse LAD disease s/p TANIYA ostial RPDA, distal RCA. and TANIYA x 2 LAD on 9/20/2023. Presents for post PCI follow-up  1. CAD, NSTEMI as documented on cath report, on DAPT, ASA, Plavix, low dose BB and losartan, tolerating meds, BP low normal, asymptomatic, denies cp, dyspnea, right radial access site healing well. - TTE normal LV function, no wma - Continue ASA, Plavix until 9/2024 - Cardiac rehab referral was placed, has not gone yet.  2. HLD: LDL 91  (9/17/2023) goal <70, now 37 on atorva 80 - Recheck labs before next visit  3. HTN: strictly controlled - continue metoprolol  Patient was advised to partake in 150 minutes of moderate exercise per week. Patient was advised to see us in 6 months if stable and certainly earlier with any new symptoms. Patient was advised to contact the office or seek medical care for any new or concerning symptoms right away.  Patient verbalized understanding and is in agreement with the above plan.

## 2024-04-02 ENCOUNTER — TRANSCRIPTION ENCOUNTER (OUTPATIENT)
Age: 59
End: 2024-04-02

## 2024-04-02 RX ORDER — SILDENAFIL 50 MG/1
50 TABLET ORAL
Qty: 6 | Refills: 3 | Status: ACTIVE | COMMUNITY
Start: 2023-01-24 | End: 1900-01-01

## 2024-05-20 ENCOUNTER — TRANSCRIPTION ENCOUNTER (OUTPATIENT)
Age: 59
End: 2024-05-20

## 2024-06-06 ENCOUNTER — TRANSCRIPTION ENCOUNTER (OUTPATIENT)
Age: 59
End: 2024-06-06

## 2024-07-25 ENCOUNTER — TRANSCRIPTION ENCOUNTER (OUTPATIENT)
Age: 59
End: 2024-07-25

## 2024-08-22 ENCOUNTER — APPOINTMENT (OUTPATIENT)
Dept: CARDIOLOGY | Facility: CLINIC | Age: 59
End: 2024-08-22
Payer: COMMERCIAL

## 2024-08-22 VITALS
WEIGHT: 186 LBS | HEIGHT: 68 IN | BODY MASS INDEX: 28.19 KG/M2 | HEART RATE: 71 BPM | SYSTOLIC BLOOD PRESSURE: 99 MMHG | OXYGEN SATURATION: 96 % | DIASTOLIC BLOOD PRESSURE: 64 MMHG

## 2024-08-22 DIAGNOSIS — I25.2 OLD MYOCARDIAL INFARCTION: ICD-10-CM

## 2024-08-22 DIAGNOSIS — I25.10 ATHEROSCLEROTIC HEART DISEASE OF NATIVE CORONARY ARTERY W/OUT ANGINA PECTORIS: ICD-10-CM

## 2024-08-22 DIAGNOSIS — Z98.61 ATHEROSCLEROTIC HEART DISEASE OF NATIVE CORONARY ARTERY W/OUT ANGINA PECTORIS: ICD-10-CM

## 2024-08-22 DIAGNOSIS — R73.03 PREDIABETES.: ICD-10-CM

## 2024-08-22 DIAGNOSIS — K21.9 GASTRO-ESOPHAGEAL REFLUX DISEASE W/OUT ESOPHAGITIS: ICD-10-CM

## 2024-08-22 DIAGNOSIS — G47.33 OBSTRUCTIVE SLEEP APNEA (ADULT) (PEDIATRIC): ICD-10-CM

## 2024-08-22 PROCEDURE — G2211 COMPLEX E/M VISIT ADD ON: CPT | Mod: NC

## 2024-08-22 PROCEDURE — 99214 OFFICE O/P EST MOD 30 MIN: CPT | Mod: 25

## 2024-08-22 PROCEDURE — 93000 ELECTROCARDIOGRAM COMPLETE: CPT

## 2024-08-23 NOTE — PHYSICAL EXAM
[Normal Conjunctiva] : normal conjunctiva [Normal] : normal venous pressure, no carotid bruit [No Murmur] : no murmur [Normal S1, S2] : normal S1, S2 [No Rub] : no rub [No Gallop] : no gallop [Clear Lung Fields] : clear lung fields [Good Air Entry] : good air entry [No Respiratory Distress] : no respiratory distress  [Soft] : abdomen soft [Non Tender] : non-tender [Normal Gait] : normal gait [Normal Bowel Sounds] : normal bowel sounds [No Edema] : no edema [No Cyanosis] : no cyanosis [Normal Radial B/L] : normal radial B/L [Moves all extremities] : moves all extremities [Normal Speech] : normal speech [Alert and Oriented] : alert and oriented [Normal memory] : normal memory [de-identified] : RRA site well healed, no hematoma, non tender 2+ radial  pulses, mild resolving ecchymosis

## 2024-08-23 NOTE — HISTORY OF PRESENT ILLNESS
[FreeTextEntry1] : 56 y/o male, nonsmoker, with a history of MARTI compliant with CPAP, presented to [facility] with intermittent chest pressure x 2 days at rest, after an ECG at urgent care, given ASA 81 mg x 2, reported ST elevations inferiorly, which were resolved in [facility] ER, troponin negative. TTE 9/17/23 revealed LVEF 50-55%. Peak troponin 0.08. Pt underwent LHC 9/18. diffuse LAD disease s/p TANIYA ostial RPDA, distal RCA. and TANIYA x 2 LAD on 9/20/2023.  12/7/2023 Presents today for follow up. Doing well. No chest pain or shortness of breath. BP very well controlled. Echocardiogram showed normal LV size and function. No wall motion abnormalities. No issues with medications. Can tolerate working out, which he has starting doing again.   Otherwise, denies orthopnea, paroxysmal nocturnal dyspnea, lower extremity edema, unexplained weight gain or dyspnea on exertion.  3/6/2024 Presents for fuv today. Feels well overall. Has no complaints. Denies cp or sob.   Otherwise, denies orthopnea, paroxysmal nocturnal dyspnea, lower extremity edema, unexplained weight gain or dyspnea on exertion.  /76, well controlled.  8/20/2024:  Presents today for follow-up.  He has felt very well.  He has had no issues over the past 6 months.  Denies chest pain or shortness of breath.  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  He presented to their local medic and had a workup that was negative for cardiac issues.  He was diagnosed with some anxiety.  Takes all of his medications without issues.  Takes his Plavix and aspirin.  No bleeding issues whatsoever at this time he can exercise without any chest pain or shortness of breath.  Blood pressure is well-controlled.  His LDL is less than 40, taking atorvastatin

## 2024-08-23 NOTE — CARDIOLOGY SUMMARY
[de-identified] : 09/26/2023 Sinus WNL 67 [de-identified] : 12/2023 1. Left ventricular cavity is normal. Left ventricular systolic function is normal with an ejection fraction of 60 % by Pires's method of disks with an ejection fraction visually estimated at 55 to 60 %. 2. Normal left ventricular diastolic function. 3. The left atrium is normal. 4. Normal right ventricular cavity size and systolic function. 5. The right atrium is normal in size. 6. No pericardial effusion seen. 7. Compared to the transthoracic echocardiogram performed on 9/17/2023 there have been no significant interval changes. [de-identified] : 9/2023 PCI to ostial rPDA (culprit) PCI with TANIYA x 2 to prox and mid LAD (nonculprit)

## 2024-08-23 NOTE — DISCUSSION/SUMMARY
[FreeTextEntry1] : A/P 58 y/o male, nonsmoker, with a history of MARTI compliant with CPAP, presented to [facility] with intermittent chest pressure x 2 days at rest, after an ECG at urgent care, given ASA 81 mg x 2, reported ST elevations inferiorly, which were resolved in [facility] ER, troponin negative. TTE 9/17/23 revealed LVEF 50-55%. Peak troponin 0.08. Pt underwent LHC 9/18. diffuse LAD disease s/p TANIYA ostial RPDA, distal RCA. and TANIYA x 2 LAD on 9/20/2023. Presents for post PCI follow-up  1. CAD, NSTEMI as documented on cath report, on DAPT, ASA, Plavix, low dose BB and losartan, tolerating meds, BP low normal, asymptomatic, denies cp, dyspnea, right radial access site healing well. - TTE normal LV function, no wma - Continue ASA, Plavix until 9/2024.  Will stop aspirin in September. - Cardiac rehab referral was placed, has not gone yet.  2. HLD: LDL 91  (9/17/2023) goal <70, now 37 on atorva 80 - Recheck labs before next visit  3. HTN: strictly controlled -Stop metoprolol today as it is making him fatigued  Patient was advised to partake in 150 minutes of moderate exercise per week. Patient was advised to see us in 6 months if stable and certainly earlier with any new symptoms. Patient was advised to contact the office or seek medical care for any new or concerning symptoms right away.  Patient verbalized understanding and is in agreement with the above plan. [EKG obtained to assist in diagnosis and management of assessed problem(s)] : EKG obtained to assist in diagnosis and management of assessed problem(s)

## 2024-09-13 ENCOUNTER — TRANSCRIPTION ENCOUNTER (OUTPATIENT)
Age: 59
End: 2024-09-13

## 2024-10-03 ENCOUNTER — TRANSCRIPTION ENCOUNTER (OUTPATIENT)
Age: 59
End: 2024-10-03

## 2024-12-30 ENCOUNTER — TRANSCRIPTION ENCOUNTER (OUTPATIENT)
Age: 59
End: 2024-12-30

## 2024-12-31 ENCOUNTER — RX CHANGE (OUTPATIENT)
Age: 59
End: 2024-12-31

## 2024-12-31 RX ORDER — SILDENAFIL 50 MG/1
50 TABLET ORAL
Qty: 6 | Refills: 3 | Status: ACTIVE | COMMUNITY
Start: 1900-01-01 | End: 1900-01-01

## 2025-02-27 ENCOUNTER — APPOINTMENT (OUTPATIENT)
Dept: CARDIOLOGY | Facility: CLINIC | Age: 60
End: 2025-02-27

## 2025-02-27 ENCOUNTER — NON-APPOINTMENT (OUTPATIENT)
Age: 60
End: 2025-02-27

## 2025-02-27 VITALS
BODY MASS INDEX: 28.19 KG/M2 | SYSTOLIC BLOOD PRESSURE: 122 MMHG | DIASTOLIC BLOOD PRESSURE: 86 MMHG | WEIGHT: 186 LBS | OXYGEN SATURATION: 98 % | HEIGHT: 68 IN | HEART RATE: 72 BPM

## 2025-02-27 PROCEDURE — 93000 ELECTROCARDIOGRAM COMPLETE: CPT

## 2025-02-27 PROCEDURE — G2211 COMPLEX E/M VISIT ADD ON: CPT | Mod: NC

## 2025-02-27 PROCEDURE — 99214 OFFICE O/P EST MOD 30 MIN: CPT
